# Patient Record
Sex: FEMALE | URBAN - METROPOLITAN AREA
[De-identification: names, ages, dates, MRNs, and addresses within clinical notes are randomized per-mention and may not be internally consistent; named-entity substitution may affect disease eponyms.]

---

## 2017-02-20 LAB — MAMMOGRAM: NORMAL

## 2017-06-22 ENCOUNTER — TELEPHONE (OUTPATIENT)
Dept: RHEUMATOLOGY | Facility: CLINIC | Age: 52
End: 2017-06-22

## 2017-06-22 NOTE — LETTER
July 7, 2017    Danika Light  [address] [facility] [address]      Dear Danika,     You are receiving this letter because you were referred to our clinic by Cielo Barber for systemic lupus erythematosus. We have tried to contact you to set up an appointment with one of our Rheumatologists but were unsuccessful in our attempts. This letter is to inform you that should you decide that you would like to make an appointment in our clinic, please call [phone], Option 2, then Option 3, then ask for Rheumatology staff to set up an appointment. Please understand that we will only keep your records until 8/7/17. If you contact us to make an appointment after 8/7/17, you will need to request those records again.    Thank you,     Adult Rheumatology Staff  [facility]  [phone]

## 2017-06-26 NOTE — TELEPHONE ENCOUNTER
Writer left message with patient following up to plans discussed on 6/22/17, to complete intake at a later date. Writer will attempt to contact patient at a later time and date.   Romina Hayden LPN

## 2017-07-07 NOTE — TELEPHONE ENCOUNTER
Writer has made x 3 attempts to contact patient to complete the lupus intake form and has been unsuccessful. Writer will close encounter and send letter to home address on file.   Romina Hayden LPN

## 2017-10-06 NOTE — TELEPHONE ENCOUNTER
Pt called back, saying she needs to set up appt. Can be reached at [phone]. Sent to ZACH Vanegas.

## 2017-10-16 ENCOUNTER — DOCUMENTATION ONLY (OUTPATIENT)
Dept: RHEUMATOLOGY | Facility: CLINIC | Age: 52
End: 2017-10-16

## 2017-10-16 NOTE — PROGRESS NOTES
Lupus Patient Intake Form    Referring provider/clinic: Dr. [facility] in [place], [place]     Primary privider/clinic:      Rheumatologist in [place] pain management and needs to establish care to manage condition     Have you been in the hospital because of Lupus? No If yes, where and when?     Has your doctor ever told you that you have SLE (systemic lupus erythematosus)? Yes . If yes, when were you diagnosed? 02/2017.  What symptoms did you have? Fatigue, muscle and joint pain, rash , hair loss and a low grade fever. What are your flare symptoms? Fever, fatigue, joint and muscle pain and intermittent rash. Poor immune response.     Who diagnosed you with lupus?    [facility] [place]     Have you ever been told that you have fibromyalgia? No If yes, when were you diagnosed? . What symptoms did you have? .    The following set of questions can be answered yes, no or I don't know. Do you currently have or have you had in the past any of the following symptoms?      Butterfly rash on the face Yes     Sun sensativity (e.g. easy burning, feeling sick in the sun?) Yes     Scarring rash Yes w/blisters     Mouth or nose sores Yes     Joint pain or swelling Yes      Fluid in your lungs or around your heart (serositis) No    Blood disorder (e.g. anemia, low blood count) No     History of kidney problems No If yes, what problems did you have?     Neurologic disorder (e.g. Seizures, stroke) No    Lupus blood tests showing that you have lupus Yes     Immune system disorder other than lupus No If yes, what immune disorder did you have?     High blood pressure Yes     Diabetes No    High cholesterol No    History of heart problems Yes  If yes, what heart problems do you have? Heart murmur dx: age 17    Any other disease that I haven't mentioned? none    Hair loss Yes     Raynaud's (hands sensitive to cold) Yes     Skin rashes Yes     Dry eyes Yes      Use of artificial  tears for dry eyes Yes     Dry mouth Yes      History of blood clots No    History of miscarriages Yes  If yes, at what week in pregnancy? 8 weeks     Chronic cough, shortness of breath or chest pain with breathing SOB and chest pain with breathing    Depression or anxiety Yes     Thinking or memory problems Yes     Have you been on any of these medications:      Prednisone No    Methotrexate No    Imuran (Azathioprine) No     Cellcept (mycophenolate) No    Hydroxychloroquine No    Cytoxan (Cyclophosphamide) No    Do you currently smoke or have you ever smoked in the past? yes If yes, how many years/packs per week? 32 years and 5 packs/week hen did you quit? 2017-5 months     How many alcoholic beverages do you drink per week? Occasionally, once per month     Do you use any stimulant drugs or cold medicine? No     Is there a family history or diagnosis of an autoimmune disease? No If yes, please list:     Are there any other symptoms or concerns that I haven't mentioned that you would like to discuss with the doctor? no    Would you like us to contact you about taking part in furture research studies? Yes     Romina Hayden LPN    10/16/2017  3:27 pm

## 2017-12-29 NOTE — PROGRESS NOTES
Called patient to follow up on release of information form. Spoke with patient she said that she returned a form back in October. Patient understood that we have not received the forms and requested we sent her new forms to fill out. Release of information forms have been placed in the mail per patient's request.    Laura Burns CMA  December 29, 2017

## 2018-01-23 NOTE — PROGRESS NOTES
Called patient's Home number on file [phone] (home) and spoke with patient and offered an appointment on 2/26/2018 with Dr. Root. Patient accepted the date and time and was instructed to arrive 15 minutes prior to appointment and asked to bring a current medication list. Patient verbalized understanding.    Records have been received and placed in the black box in the clinic.      Message has been sent to Clinic Coordinator for assistance with scheduling.  Nola Hamlin CMA  1/23/2018 10:23 AM

## 2018-02-26 ENCOUNTER — OFFICE VISIT (OUTPATIENT)
Dept: RHEUMATOLOGY | Facility: CLINIC | Age: 53
End: 2018-02-26
Attending: INTERNAL MEDICINE
Payer: COMMERCIAL

## 2018-02-26 VITALS
HEIGHT: 65 IN | DIASTOLIC BLOOD PRESSURE: 79 MMHG | HEART RATE: 65 BPM | TEMPERATURE: 98.2 F | SYSTOLIC BLOOD PRESSURE: 138 MMHG | WEIGHT: 150.4 LBS | BODY MASS INDEX: 25.06 KG/M2

## 2018-02-26 DIAGNOSIS — M35.00 SICCA, UNSPECIFIED TYPE (H): ICD-10-CM

## 2018-02-26 DIAGNOSIS — M79.7 FIBROMYALGIA: ICD-10-CM

## 2018-02-26 DIAGNOSIS — M32.9 SYSTEMIC LUPUS ERYTHEMATOSUS, UNSPECIFIED SLE TYPE, UNSPECIFIED ORGAN INVOLVEMENT STATUS (H): ICD-10-CM

## 2018-02-26 DIAGNOSIS — R14.3 FLATULENCE, ERUCTATION, AND GAS PAIN: ICD-10-CM

## 2018-02-26 DIAGNOSIS — M35.01 SJOGREN'S SYNDROME WITH KERATOCONJUNCTIVITIS SICCA (H): ICD-10-CM

## 2018-02-26 DIAGNOSIS — M35.00 SICCA, UNSPECIFIED TYPE (H): Primary | ICD-10-CM

## 2018-02-26 DIAGNOSIS — R14.1 FLATULENCE, ERUCTATION, AND GAS PAIN: ICD-10-CM

## 2018-02-26 DIAGNOSIS — R14.2 FLATULENCE, ERUCTATION, AND GAS PAIN: ICD-10-CM

## 2018-02-26 PROBLEM — M32.0: Status: ACTIVE | Noted: 2018-02-26

## 2018-02-26 LAB
ALBUMIN SERPL-MCNC: 3.8 G/DL (ref 3.4–5)
ALBUMIN UR-MCNC: NEGATIVE MG/DL
ALP SERPL-CCNC: 128 U/L (ref 40–150)
ALT SERPL W P-5'-P-CCNC: 19 U/L (ref 0–50)
ANION GAP SERPL CALCULATED.3IONS-SCNC: 5 MMOL/L (ref 3–14)
APPEARANCE UR: CLEAR
AST SERPL W P-5'-P-CCNC: 17 U/L (ref 0–45)
BASOPHILS # BLD AUTO: 0.1 10E9/L (ref 0–0.2)
BASOPHILS NFR BLD AUTO: 1 %
BILIRUB DIRECT SERPL-MCNC: <0.1 MG/DL (ref 0–0.2)
BILIRUB SERPL-MCNC: 0.2 MG/DL (ref 0.2–1.3)
BILIRUB UR QL STRIP: NEGATIVE
BUN SERPL-MCNC: 10 MG/DL (ref 7–30)
CALCIUM SERPL-MCNC: 8.9 MG/DL (ref 8.5–10.1)
CHLORIDE SERPL-SCNC: 105 MMOL/L (ref 94–109)
CK SERPL-CCNC: 87 U/L (ref 30–225)
CO2 SERPL-SCNC: 30 MMOL/L (ref 20–32)
COLOR UR AUTO: YELLOW
CREAT SERPL-MCNC: 0.77 MG/DL (ref 0.52–1.04)
DAT POLY-SP REAG RBC QL: NORMAL
DIFFERENTIAL METHOD BLD: NORMAL
EOSINOPHIL # BLD AUTO: 0.4 10E9/L (ref 0–0.7)
EOSINOPHIL NFR BLD AUTO: 5.6 %
ERYTHROCYTE [DISTWIDTH] IN BLOOD BY AUTOMATED COUNT: 12.7 % (ref 10–15)
GFR SERPL CREATININE-BSD FRML MDRD: 78 ML/MIN/1.7M2
GLUCOSE SERPL-MCNC: 73 MG/DL (ref 70–99)
GLUCOSE UR STRIP-MCNC: NEGATIVE MG/DL
HBV CORE AB SERPL QL IA: NONREACTIVE
HBV SURFACE AG SERPL QL IA: NONREACTIVE
HCT VFR BLD AUTO: 41.7 % (ref 35–47)
HCV AB SERPL QL IA: NONREACTIVE
HGB BLD-MCNC: 13.5 G/DL (ref 11.7–15.7)
HGB UR QL STRIP: NEGATIVE
IMM GRANULOCYTES # BLD: 0 10E9/L (ref 0–0.4)
IMM GRANULOCYTES NFR BLD: 0.2 %
KETONES UR STRIP-MCNC: NEGATIVE MG/DL
LEUKOCYTE ESTERASE UR QL STRIP: NEGATIVE
LYMPHOCYTES # BLD AUTO: 2.2 10E9/L (ref 0.8–5.3)
LYMPHOCYTES NFR BLD AUTO: 35.4 %
MCH RBC QN AUTO: 31.4 PG (ref 26.5–33)
MCHC RBC AUTO-ENTMCNC: 32.4 G/DL (ref 31.5–36.5)
MCV RBC AUTO: 97 FL (ref 78–100)
MONOCYTES # BLD AUTO: 0.7 10E9/L (ref 0–1.3)
MONOCYTES NFR BLD AUTO: 11.6 %
MUCOUS THREADS #/AREA URNS LPF: PRESENT /LPF
NEUTROPHILS # BLD AUTO: 2.9 10E9/L (ref 1.6–8.3)
NEUTROPHILS NFR BLD AUTO: 46.2 %
NITRATE UR QL: NEGATIVE
NRBC # BLD AUTO: 0 10*3/UL
NRBC BLD AUTO-RTO: 0 /100
PH UR STRIP: 7 PH (ref 5–7)
PLATELET # BLD AUTO: 162 10E9/L (ref 150–450)
POTASSIUM SERPL-SCNC: 3.8 MMOL/L (ref 3.4–5.3)
PROT SERPL-MCNC: 7.8 G/DL (ref 6.8–8.8)
RBC # BLD AUTO: 4.3 10E12/L (ref 3.8–5.2)
RBC #/AREA URNS AUTO: <1 /HPF (ref 0–2)
SODIUM SERPL-SCNC: 140 MMOL/L (ref 133–144)
SOURCE: ABNORMAL
SP GR UR STRIP: 1.01 (ref 1–1.03)
SQUAMOUS #/AREA URNS AUTO: <1 /HPF (ref 0–1)
TSH SERPL DL<=0.005 MIU/L-ACNC: 1.03 MU/L (ref 0.4–4)
UROBILINOGEN UR STRIP-MCNC: 0 MG/DL (ref 0–2)
WBC # BLD AUTO: 6.3 10E9/L (ref 4–11)
WBC #/AREA URNS AUTO: <1 /HPF (ref 0–2)

## 2018-02-26 PROCEDURE — 00000401 ZZHCL STATISTIC THROMBIN TIME NC: Performed by: INTERNAL MEDICINE

## 2018-02-26 PROCEDURE — 86880 COOMBS TEST DIRECT: CPT | Performed by: INTERNAL MEDICINE

## 2018-02-26 PROCEDURE — 82784 ASSAY IGA/IGD/IGG/IGM EACH: CPT | Performed by: INTERNAL MEDICINE

## 2018-02-26 PROCEDURE — 80048 BASIC METABOLIC PNL TOTAL CA: CPT | Performed by: INTERNAL MEDICINE

## 2018-02-26 PROCEDURE — 86255 FLUORESCENT ANTIBODY SCREEN: CPT | Performed by: INTERNAL MEDICINE

## 2018-02-26 PROCEDURE — 84443 ASSAY THYROID STIM HORMONE: CPT | Performed by: INTERNAL MEDICINE

## 2018-02-26 PROCEDURE — 86147 CARDIOLIPIN ANTIBODY EA IG: CPT | Performed by: INTERNAL MEDICINE

## 2018-02-26 PROCEDURE — 86146 BETA-2 GLYCOPROTEIN ANTIBODY: CPT | Performed by: INTERNAL MEDICINE

## 2018-02-26 PROCEDURE — 86235 NUCLEAR ANTIGEN ANTIBODY: CPT | Performed by: INTERNAL MEDICINE

## 2018-02-26 PROCEDURE — 86160 COMPLEMENT ANTIGEN: CPT | Performed by: INTERNAL MEDICINE

## 2018-02-26 PROCEDURE — 00000167 ZZHCL STATISTIC INR NC: Performed by: INTERNAL MEDICINE

## 2018-02-26 PROCEDURE — 85613 RUSSELL VIPER VENOM DILUTED: CPT | Performed by: INTERNAL MEDICINE

## 2018-02-26 PROCEDURE — 86704 HEP B CORE ANTIBODY TOTAL: CPT | Performed by: INTERNAL MEDICINE

## 2018-02-26 PROCEDURE — 85025 COMPLETE CBC W/AUTO DIFF WBC: CPT | Performed by: INTERNAL MEDICINE

## 2018-02-26 PROCEDURE — 86225 DNA ANTIBODY NATIVE: CPT | Performed by: INTERNAL MEDICINE

## 2018-02-26 PROCEDURE — 85730 THROMBOPLASTIN TIME PARTIAL: CPT | Performed by: INTERNAL MEDICINE

## 2018-02-26 PROCEDURE — 82550 ASSAY OF CK (CPK): CPT | Performed by: INTERNAL MEDICINE

## 2018-02-26 PROCEDURE — 83516 IMMUNOASSAY NONANTIBODY: CPT | Performed by: INTERNAL MEDICINE

## 2018-02-26 PROCEDURE — 81001 URINALYSIS AUTO W/SCOPE: CPT | Performed by: INTERNAL MEDICINE

## 2018-02-26 PROCEDURE — 80076 HEPATIC FUNCTION PANEL: CPT | Performed by: INTERNAL MEDICINE

## 2018-02-26 PROCEDURE — 86803 HEPATITIS C AB TEST: CPT | Performed by: INTERNAL MEDICINE

## 2018-02-26 PROCEDURE — G0463 HOSPITAL OUTPT CLINIC VISIT: HCPCS | Mod: ZF

## 2018-02-26 PROCEDURE — 86431 RHEUMATOID FACTOR QUANT: CPT | Performed by: INTERNAL MEDICINE

## 2018-02-26 PROCEDURE — 82787 IGG 1 2 3 OR 4 EACH: CPT | Performed by: INTERNAL MEDICINE

## 2018-02-26 PROCEDURE — 82306 VITAMIN D 25 HYDROXY: CPT | Performed by: INTERNAL MEDICINE

## 2018-02-26 PROCEDURE — 87389 HIV-1 AG W/HIV-1&-2 AB AG IA: CPT | Performed by: INTERNAL MEDICINE

## 2018-02-26 PROCEDURE — 87340 HEPATITIS B SURFACE AG IA: CPT | Performed by: INTERNAL MEDICINE

## 2018-02-26 PROCEDURE — 82595 ASSAY OF CRYOGLOBULIN: CPT | Performed by: INTERNAL MEDICINE

## 2018-02-26 PROCEDURE — 86256 FLUORESCENT ANTIBODY TITER: CPT | Performed by: INTERNAL MEDICINE

## 2018-02-26 PROCEDURE — 36415 COLL VENOUS BLD VENIPUNCTURE: CPT | Performed by: INTERNAL MEDICINE

## 2018-02-26 PROCEDURE — 85597 PHOSPHOLIPID PLTLT NEUTRALIZ: CPT | Performed by: INTERNAL MEDICINE

## 2018-02-26 RX ORDER — LANOLIN ALCOHOL/MO/W.PET/CERES
50 CREAM (GRAM) TOPICAL
COMMUNITY
Start: 2017-03-27

## 2018-02-26 RX ORDER — PILOCARPINE HYDROCHLORIDE 5 MG/1
TABLET, FILM COATED ORAL
Qty: 30 TABLET | Refills: 0 | Status: SHIPPED | OUTPATIENT
Start: 2018-02-26 | End: 2018-04-09

## 2018-02-26 RX ORDER — DOXEPIN HYDROCHLORIDE 10 MG/ML
10 SOLUTION ORAL
COMMUNITY
Start: 2017-02-13

## 2018-02-26 ASSESSMENT — PAIN SCALES - GENERAL: PAINLEVEL: MODERATE PAIN (4)

## 2018-02-26 NOTE — NURSING NOTE
"Chief Complaint   Patient presents with     Consult     establish care with lupus       Initial /79  Pulse 65  Temp 98.2  F (36.8  C) (Oral)  Ht 1.638 m (5' 4.5\")  Wt 68.2 kg (150 lb 6.4 oz)  BMI 25.42 kg/m2 Estimated body mass index is 25.42 kg/(m^2) as calculated from the following:    Height as of this encounter: 1.638 m (5' 4.5\").    Weight as of this encounter: 68.2 kg (150 lb 6.4 oz).  Medication Reconciliation: complete   Savanah Soto      "

## 2018-02-26 NOTE — PATIENT INSTRUCTIONS
Blood work today   Urine test   Start evoxac as needed for dry mouth   Start routine exercise (aerobic) and stretching - yves chi and or yoga   Based on blood work, can consider pursuing lip biopsy   Will consider starting plaquenil 200 mg twice a day - will talk based on results from today   Return in 4 months with me

## 2018-02-26 NOTE — MR AVS SNAPSHOT
After Visit Summary   2/26/2018    Danika Light    MRN: [id number]           Patient Information     Date Of Birth          1965        Visit Information        Provider Department      2/26/2018 9:30 AM Bailey Root MD [facility]        Today's Diagnoses     Sicca, unspecified type (H)    -  1    Systemic lupus erythematosus, unspecified SLE type, unspecified organ involvement status (H)        Fibromyalgia          Care Instructions    Blood work today   Urine test   Start evoxac as needed for dry mouth   Start routine exercise (aerobic) and stretching - yves chi and or yoga   Based on blood work, can consider pursuing lip biopsy   Will consider starting plaquenil 200 mg twice a day - will talk based on results from today   Return in 4 months with me                  Follow-ups after your visit        Follow-up notes from your care team     Return in about 4 months (around 6/26/2018).      Your next 10 appointments already scheduled     Feb 26, 2018 12:15 PM CST   Lab with  LAB   [facility] ([facility])    [address]  [address]  [place] [id number] [phone]            Jun 25, 2018  9:30 AM CDT   (Arrive by 9:15 AM)   Return Visit with Bailey Root MD   [facility] ([facility])    [address]  [address]  [place] [id number] [phone]              Future tests that were ordered for you today     Open Future Orders        Priority Expected Expires Ordered    Direct antiglobulin test (DATG) Routine  2/26/2019 2/26/2018    Lupus Anticoagulant Panel Routine  2/26/2019 2/26/2018    Beta 2 Glycoprotein 1 Antibody IgG Routine  2/26/2019 2/26/2018    Beta 2 Glycoprotein 1 Antibody IgM Routine  2/26/2019 2/26/2018    Cardiolipin Paige IgG and IgM Routine  2/26/2019 2/26/2018    TSH with free T4 reflex Routine  2/26/2019 2/26/2018    Immunoglobulin G subclasses Routine   2/26/2019 2/26/2018    IgG Routine  2/26/2019 2/26/2018    Hepatitis B core antibody Routine  2/26/2019 2/26/2018    Hepatitis B surface antigen Routine  2/26/2019 2/26/2018    Hepatitis C antibody Routine  2/26/2019 2/26/2018    CK total Routine  2/26/2019 2/26/2018    IgG Routine  2/26/2019 2/26/2018    IgM Routine  2/26/2019 2/26/2018    IgA Routine  2/26/2019 2/26/2018    Cryoglobulin quantitative Routine  2/26/2019 2/26/2018    Centromere Antibody IgG Routine  2/26/2019 2/26/2018    Routine UA with microscopic Routine  2/26/2019 2/26/2018    Basic metabolic panel Routine  2/26/2019 2/26/2018    CBC with platelets differential Routine  2/26/2019 2/26/2018    Hepatic panel Routine  2/26/2019 2/26/2018    Complement C3 Routine  2/26/2019 2/26/2018    Complement C4 Routine  2/26/2019 2/26/2018    DNA double stranded antibodies Routine  2/26/2019 2/26/2018    ALBINO Panel (RNP, SMITH, Scleroderma, SSAB, SSBB); ALBINO, Antibodies, Panel Routine  2/26/2019 2/26/2018    Rheumatoid factor Routine  2/26/2019 2/26/2018            Who to contact     If you have questions or need follow up information about today's clinic visit or your schedule please contact [facility] directly at [phone].  Normal or non-critical lab and imaging results will be communicated to you by memory lane syndicationshart, letter or phone within 4 business days after the clinic has received the results. If you do not hear from us within 7 days, please contact the clinic through [employer] or phone. If you have a critical or abnormal lab result, we will notify you by phone as soon as possible.  Submit refill requests through [employer] or call your pharmacy and they will forward the refill request to us. Please allow 3 business days for your refill to be completed.          Additional Information About Your Visit        memory lane syndicationshart Information     [employer] lets you send messages to your doctor, view your test results, renew your prescriptions, schedule appointments and  "more. To sign up, go to [email or web address] . Click on \"Log in\" on the left side of the screen, which will take you to the Welcome page. Then click on \"Sign up Now\" on the right side of the page.     You will be asked to enter the access code listed below, as well as some personal information. Please follow the directions to create your username and password.     Your access code is: [email or web address]  Expires: 2018  6:30 AM     Your access code will  in 90 days. If you need help or a new code, please call your [place] clinic or [phone].        Care EveryWhere ID     This is your Care EveryWhere ID. This could be used by other organizations to access your [facility] medical records  [id number]        Your Vitals Were     Pulse Temperature Height BMI (Body Mass Index)          65 98.2  F (36.8  C) (Oral) 1.638 m (5' 4.5\") 25.42 kg/m2         Blood Pressure from Last 3 Encounters:   18 138/79    Weight from Last 3 Encounters:   18 68.2 kg (150 lb 6.4 oz)                 Today's Medication Changes          These changes are accurate as of 18 11:55 AM.  If you have any questions, ask your nurse or doctor.               Start taking these medicines.        Dose/Directions    pilocarpine 5 MG tablet   Commonly known as:  SALAGEN   Used for:  Sicca, unspecified type (H)   Started by:  Bailey Root MD        Take 1 tablet up to 3x daily for dry mouth   Quantity:  30 tablet   Refills:  0            Where to get your medicines      These medications were sent to [id number] - [place], [place] - [address] [place] [address]    Hours:  Test fax successful 03 kr Phone:  [phone]     pilocarpine 5 MG tablet                Primary Care Provider Fax #    Provider Not In System [phone]                Equal Access to Services     DEEDEE TANG AH: Kelsey Fong, judi jenkins, romario pires " dustinward nevaivan laIvetteaabettie ah. So [facility] [phone].    ATENCIÓN: Si remediosla rob, tiene a raymond disposición servicios gratuitos de asistencia lingüística. Darrell al [phone].    We comply with applicable federal civil rights laws and [place] laws. We do not discriminate on the basis of race, color, national origin, age, disability, sex, sexual orientation, or gender identity.            Thank you!     Thank you for choosing [facility] RHEUMAT[facility]  for your care. Our goal is always to provide you with excellent care. Hearing back from our patients is one way we can continue to improve our services. Please take a few minutes to complete the written survey that you may receive in the mail after your visit with us. Thank you!             Your Updated Medication List - Protect others around you: Learn how to safely use, store and throw away your medicines at www.disposemymeds.org.          This list is accurate as of 2/26/18 11:55 AM.  Always use your most recent med list.                   Brand Name Dispense Instructions for use Diagnosis    doxepin 10 MG/ML (HIGH CONC) solution    SINEquan     10 mg        pilocarpine 5 MG tablet    SALAGEN    30 tablet    Take 1 tablet up to 3x daily for dry mouth    Sicca, unspecified type (H)       pyridoxine 50 MG Tabs    VITAMIN B-6     50 mg        ZANTAC PO      Take by mouth 2 times daily

## 2018-02-26 NOTE — LETTER
2/26/2018       RE: Danika Light  [id number] [facility] [address]     Dear Colleague,    Thank you for referring your patient, Danika Light, to the [facility] RHEUMATOLOGY at [facility]. Please see a copy of my visit note below.    [facility] - Rheumatology Clinic Visit     Danika Light MRN# [id number]   YOB: 1965    Primary care provider: System, Provider Not In  Feb 26, 2018          Assessment and Plan:     # positive HAN, likely mild SLE vs Sjogren's vs UCTD; sicca, Raynaud's, arthralgias, myalgias, paresthesias  # fibromyalgia   # osteopenia - T score - 1.6 of L femur 1/2016  # hx of tricuspid valvular abnormality, do not see prior TTE  # hx of L shoulder adhesive capsulitis, significantly improved with PT    The patient has a collection of signs and symptoms that are concerning for a connective tissue disease such as SLE, Sjogren's syndrome, Systemic sclerosis, vs other. She does actually meet SLICC criteria for SLE with +HAN, LAC, and arthritis, oral ulcers, and alopecia, although if this is truly SLE it is quite mild with no severe internal organ involvement. I will screen for CTD with remaining ALBINO panel including SSA, SSB, RNP, Scl-70, and recheck dsDNA and complement levels. To complete workup for Sjogren's I will send RF, cryogs, immunoglobulins, centromere, and celiac panel (given GI symptoms). I will screen for chronic infections that may mimic CTD such as HCV, HBV, HIV, TB. I did discuss even if SSA/SSB return negative, she may still have seronegative Sjooren's syndrome and I discussed possible role for lip biopsy to prove diagnosis, although this wouldn't necessarily change our treatment. I did discuss role for plaquenil but we agreed to hold off on initiating until we complete the workup. I will go ahead and start her on a cholinergic medication to stimulate saliva flow glenn given her extensive dental issues likely at least in  part due to lack of saliva production. Ddx beyond the above can also include sarcoidosis, ANCA-associated vasculitis (given sinus issues, although ANCA in 1/2017 negative, CT sinuses 2015 showed minimal mucosa disease), etc. I do think there is a component of FMS as well, but I do not think this accounts for all of her symptoms. I did discuss diagnosis of FMS and treatment strategies.     Recommendations  - check ALBINO panel including dsDNA, C3/C4, quantitative immunoglobulins, IgG subclasses, RF, cryoglobulins, spep, MELODY, APS Ab's, HCV, HBV, HIV, celiac panel, CK, TSH w/ reflex FT4, Vit D, as well as CBC, BMP, UA, LFT  - tried to order evoxac - not covered by insurance. Will trial pilocarpine 5 mg up to 3x per day PRN for dry mouth. Encouraged her to try biotene products.   - consider addition of  mg BID. We agreed to complete lab workup before pursuing immunomodulatory therapy.    - likely needs updated TTE and heart monitoring (?Holter), updated hand xrays, baseline CXR, and quantiferon gold testing, will obtain at upcoming visit. Also consider formal neuropsych testing given brain fog   - encouraged Ca/vit D supplementation given osteopenia. It has been >2 years since her last DEXA, would be worthwhile to repeat. Will discuss at next visit. Need to calculate FRAX to determine if bisphosphonate warranted. In either case, should stop smoking and incorporate weight-bearing exercise.   - also discussed tx for FMS - trying to incorporate routine exercise regimen, routine stretching such as yves chi and/or yoga, improving sleep quality   - should be on PPI or H2 blocker on daily basis given GERD symptoms    Health maintenance  HBV: today  HCV: today  HIV:  today  TB: in future  Vitamin D and calcium/bone health: Vit D normal 1/2017. Recheck today  Cancer screenings (skin, breast, colon, pap): mammogram, colonoscopy UTD  Immunizations:       Influenza:       PPSV23:       Ajdtyox60:       Zostavax:      I will be  back in touch with the patient through mychart/letter when results are available.     RTC 4 m, sooner PRN    Patient was seen and discussed with Dr. Villeda.     Bailey Root MD  Rheumatology Fellow  Pager [phone]    Attending Note: I saw and evaluated the patient with Dr. Root. I agree with the assessment and plan.    Saji Villeda MD    Orders Placed This Encounter   Procedures     Complement C3     Complement C4     DNA double stranded antibodies     ALBINO Panel (RNP, SMITH, Scleroderma, SSAB, SSBB); ALBINO, Antibodies, Panel     Rheumatoid factor     Routine UA with microscopic     Basic metabolic panel     CBC with platelets differential     Hepatic panel     Hepatitis B core antibody     Hepatitis B surface antigen     Hepatitis C antibody     CK total     IgG     IgM     IgA     Cryoglobulin quantitative     Centromere Antibody IgG     Immunoglobulin G subclasses     IgG     TSH with free T4 reflex     Lupus Anticoagulant Panel     Beta 2 Glycoprotein 1 Antibody IgG     Beta 2 Glycoprotein 1 Antibody IgM     Cardiolipin Paige IgG and IgM     HIV Antigen Antibody Combo     Tissue transglutaminase antibody IgA     Deamidated Giladin Peptide Paige IgA IgG     Endomysial Antibody IgA by IFA     Vitamin D Deficiency     Direct antiglobulin test (DATG)               Active Problem List:     Patient Active Problem List    Diagnosis Date Noted     Drug-induced systemic lupus erythematosus, unspecified organ involvement status (H) 02/26/2018     Priority: Medium            History of Present Illness:     Chief Complaint   Patient presents with     Consult     establish care with lupus       The patient is a 52 year old female who presents today as a self-referral for a history of SLE. She reports a long-standing history of fatigue, myalgias, arthralgias, weakness, paresthesias, sicca symptoms, Raynaud's (since her 30's), alopecia, and various rashes. She was found to have a positive HAN 1:160 (homogeneous  "pattern) and MPO (but negative ANCA) and was referred to rheumatology in 2/2017. She was initially evaluated by Dr. Hartman of [facility] and diagnosed with fibromyalgia, chronic pain syndrome, and osteoarthritis. Additional lab testing revealed positive lupus anticoagulant and low titer + aCL IgM, with negative dsDNA, Feliz, RF/CCP, ANCA, and normal C3/C4. She was treated with zofran as needed for nausea, doxepin at night for sleep, and celexa and tramadol for FMS symptoms.     The patient reports for many years she has had various symptoms that have largely  Been unexplained. She reports developing Raynaud's of her hands/feet in her 30's, describing color change to white then red with cold exposure, but also notes atypical symptoms of this happening in the summer as well. She denies having ulcers of the tips of fingers, but does think her fingers/toes have cracking skin. She also notes a long history of joint pain including most joints involving the feet, knees, hips (points anterior groin), hands, wrists, and shoulders. Symptoms seem to be more severe on L side. She denies swelling of the joints, but does note her hands will feel puffy and it is hard to get her rings on/off. She does have some morning stiffness lasting about an hour. She also notes both muscle pain and weakness \"all over,\" but specifically points out decreased  strength. She has noticed she's dropping objects. She does recall having EMG of her arms but thinks this was normal. She has chronic back pain and sciatica of her left leg.     She also notes various rashes that are heat sensitive (not actually photosensitive or just in areas of sun exposure), with a blistering rash on her chest, back, arms, and shoulders. She has never seen dermatology for this and not had this biopsied. She notes numbness/tingling \"all over\" that is lightning like, lasting only seconds and occurs intermittently without obvious trigger. She has ongoing " "sicca symptoms for many years. Follows with ophthalmology and uses AT as needed. Has never been on restasis or xiidra. She does think she had Schirmer's testing done at some point, but she is unclear. She does not wear contacts. Regarding her dry mouth, she drinks water frequently throughout the day. She does not use biotene products and has never been on evoxac or salagen. She did have to have many teeth pulled in the last 10 years due to poor dentition. She does have GERD symptoms mostly at night. Does describe oropharyngeal dysphagia. Uses ranitidine as needed which does help. Describes brain fog symptoms.     Has chronic abdominal bloating and constipation. Some diarrhea. Eats one meal per day but gaining weight. Has had a colonoscopy several years ago that she reports was normal. Some chronic sinus problems, frequent chest colds, epistaxis. Does report oral ulcers, tiny bumps along the upper front gums that she was told may have been thrush (but didn't improve with nystatin).     Describes intermittent \"dizzy\" episodes which she describes more as lightheadedness, SOB, and palpitations. These can occur at rest, not just with activity. She has not had this worked up by cardiology or her primary. Is in between primary care providers currently as recent one retired. Reports hx of tricuspid valvular disease, although she can't recall when her last TTE was.     Describes \"flares\" of many of the above symptoms, primarily arthralgias, myalgias, weakness that will last for several weeks then resolve on own (has never been on prednisone), occurring about 5x per year. She has never been on plaquenil. Has tried tylenol and ibuprofen with some help for muscle/joint pain. Naproxen didn't help.     Doesn't sleep well at night. Will wake up and not be able to fall back asleep due to pain. Doxepin does help her sleep some. Does not have routine exercise regimen.     Hx of 1 late 1st trimester/early 2nd trimester miscarriage " "that required a D&C. No hx of blood clots. No fam hx of recurrent miscarriage.            Review of Systems:   Complete ROS negative except for symptoms mentioned in the HPI            Past Medical History:   L shoulder adhesive capsulitis  Fibromyalgia  Chronic pain syndrome  Raynaud's phenomenon since age 30's          Social History:   Has 4 children - 2 younger that live in house and 2 out of house. Daughter at [employer] today   -  at [employer] today  Hasn't worked in 8 years due to her symptoms   Occasional EtOH use  Current smoker - 2 packs/week         Family History:     GF - CVA, recurrent thrombosis  MGM - t1DM (diagnosed in 30's)  PGM - RA         Allergies:     Allergies   Allergen Reactions     Cefaclor Hives            Medications:     Current Outpatient Prescriptions   Medication Sig Dispense Refill     doxepin (SINEQUAN) 10 MG/ML (HIGH CONC) solution 10 mg       pyridoxine (VITAMIN B-6) 50 MG TABS 50 mg       RaNITidine HCl (ZANTAC PO) Take by mouth 2 times daily       pilocarpine (SALAGEN) 5 MG tablet Take 1 tablet up to 3x daily for dry mouth 30 tablet 0            Physical Exam:   Blood pressure 138/79, pulse 65, temperature 98.2  F (36.8  C), temperature source Oral, height 1.638 m (5' 4.5\"), weight 68.2 kg (150 lb 6.4 oz).  Wt Readings from Last 4 Encounters:   02/26/18 68.2 kg (150 lb 6.4 oz)     BP Readings from Last 3 Encounters:   02/26/18 138/79       Constitutional: well-developed, appearing stated age  Eyes: PERRLA, normal conjunctiva, sclera  ENT: nl external ears, nose, lips. No mucous membrane lesions, slightly decreased salivary pool. Wearing partials on upper teeth.   Neck: no cervical or supraclavicular lymphadenopathy, no parotid swelling, normal palpable thyroid  Resp: CTAB, no wheezing, breathing comfortably on room air  CV: RRR, no m/r/g, no LE edema  GI: soft, NT/ND, +BS, no HSM  : not tested  Lymph: no cervical, supraclavicular or epitrochlear nodes  MS: All joints " including shoulder, elbow, wrist, MCP/PIP/DIP, hip, knee, ankle, and foot MTP/PIP/DIP joints examined and found normal with full ROM and w/o synovitis or deformity other than that listed below:   Neck - no tenderness, FROM  Shoulder - L shoulder mild pain anterior/posterior palpation and pain with ROM, but able to do. FROM in full abduction/adduction and internal/external rotation. No GH effusion/warmth. AC and SC joints without effusion or tenderness   Elbow - FROM and no joint effusion; nontender BL   Wrist - FROM and no joint effusion. Mild tenderness to wrists without synovitis  Hand - mild tenderness to MCP and PIP joints without synovitis. Some tenderness inter-articular between joints along phalanges without swelling. Able to make full fist and normal  strength  Back - no tenderness along spine  Pelvic - no tenderness along BL SI joints. HILLARY negative   Hip - FROM; nontender BL  Knee - FROM, no joint effusion or joint line tenderness  Ankle - FROM and no joint effusion, nontender   Foot - no focal pain or synovitis on palpation of the MTPs bilaterally, negative MTP squeeze. Does have well-healing scar along left lateral foot from recent surgery.   Spine and gait normal.   No active synovitis. Did have multiple tender points on exam - 16/18 FMS points.   No dactylitis,  tenosynovitis, enthesopathy.  Skin: no nail pitting; no nodules, no rash in exposed areas  Psych: nl judgement, orientation, memory, affect.  Neuro: CN II-XII grossly intact, moving all extremities equally, normal gait. Strength BL shoulder abduction 5/5, BL hip flexors 5/5         Data:     No results found for this or any previous visit.    Bailey Root MD    Lab Results   Component Value Date    WBC 6.3 02/26/2018     Lab Results   Component Value Date    RBC 4.30 02/26/2018     Lab Results   Component Value Date    HGB 13.5 02/26/2018     Lab Results   Component Value Date    HCT 41.7 02/26/2018     No components found for:  "MCT  Lab Results   Component Value Date    MCV 97 02/26/2018     Lab Results   Component Value Date    MCH 31.4 02/26/2018     Lab Results   Component Value Date    MCHC 32.4 02/26/2018     Lab Results   Component Value Date    RDW 12.7 02/26/2018     Lab Results   Component Value Date     02/26/2018       Lab Results   Component Value Date    AST 17 02/26/2018     Lab Results   Component Value Date    ALT 19 02/26/2018     No results found for: BILICONJ   Lab Results   Component Value Date    BILITOTAL 0.2 02/26/2018     Lab Results   Component Value Date    ALBUMIN 3.8 02/26/2018     Lab Results   Component Value Date    PROTTOTAL 7.8 02/26/2018      Lab Results   Component Value Date    ALKPHOS 128 02/26/2018       Last Basic Metabolic Panel:  Lab Results   Component Value Date     02/26/2018      Lab Results   Component Value Date    POTASSIUM 3.8 02/26/2018     Lab Results   Component Value Date    CHLORIDE 105 02/26/2018     Lab Results   Component Value Date    DAVY 8.9 02/26/2018     Lab Results   Component Value Date    CO2 30 02/26/2018     Lab Results   Component Value Date    BUN 10 02/26/2018     Lab Results   Component Value Date    CR 0.77 02/26/2018     Lab Results   Component Value Date    GLC 73 02/26/2018     TTE reportedly last in 2008, there was partial report pasted in prior rheumatology note:  \"Echo in 2008, obtained for history of tricuspid valve disease, showed an EF of 88%, normal LV, normal aorta, mild-to-moderate leaflet thickening of the mitral valve with trivial regurgitation, and small pericardial effusion, circumferential to the heart. Repeat evaluation was recommended in 3 months, and this was not done.\"    Prior workup:  Positive HAN x2, 1:80 and 1:160 (homogeneous)  Low titer +aCL IgM 38  Positive LAC  Negative Sm, dsDNA, aCL IgG, B2GP IgG/IgM  Normal CK, TSH, vitamin D   Negative ANCA  Normal C3, C4  OK Cr, CBC    MRI L shoulder: findings concerning for possible " adhesive capsulitis     Reviewed all relevant data including labs and imaging.     Again, thank you for allowing me to participate in the care of your patient.      Sincerely,    Bailey Root MD

## 2018-02-27 LAB
C3 SERPL-MCNC: 111 MG/DL (ref 76–169)
C4 SERPL-MCNC: 20 MG/DL (ref 15–50)
CARDIOLIPIN ANTIBODY IGG: <1.6 GPL-U/ML (ref 0–19.9)
CARDIOLIPIN ANTIBODY IGM: 8.5 MPL-U/ML (ref 0–19.9)
CENTROMERE IGG SER-ACNC: <0.2 AI (ref 0–0.9)
ENA RNP IGG SER IA-ACNC: <0.2 AI (ref 0–0.9)
ENA SCL70 IGG SER IA-ACNC: 0.2 AI (ref 0–0.9)
ENA SM IGG SER-ACNC: <0.2 AI (ref 0–0.9)
ENA SS-A IGG SER IA-ACNC: 1.8 AI (ref 0–0.9)
ENA SS-B IGG SER IA-ACNC: 0.4 AI (ref 0–0.9)
IGA SERPL-MCNC: 185 MG/DL (ref 70–380)
IGG SERPL-MCNC: 1090 MG/DL (ref 695–1620)
IGG1 SER-MCNC: 454 MG/DL (ref 300–856)
IGG2 SER-MCNC: 393 MG/DL (ref 158–761)
IGG3 SER-MCNC: 107 MG/DL (ref 24–192)
IGG4 SER-MCNC: 57 MG/DL (ref 11–86)
IGM SERPL-MCNC: 92 MG/DL (ref 60–265)
RHEUMATOID FACT SER NEPH-ACNC: <20 IU/ML (ref 0–20)

## 2018-02-28 PROBLEM — M35.00 SJOGREN'S SYNDROME (H): Status: ACTIVE | Noted: 2018-02-28

## 2018-02-28 PROBLEM — M32.0: Status: RESOLVED | Noted: 2018-02-26 | Resolved: 2018-02-28

## 2018-02-28 LAB
B2 GLYCOPROT1 IGG SERPL IA-ACNC: <0.6 U/ML
B2 GLYCOPROT1 IGM SERPL IA-ACNC: 8.2 U/ML
DEPRECATED CALCIDIOL+CALCIFEROL SERPL-MC: 32 UG/L (ref 20–75)
DSDNA AB SER-ACNC: 1 IU/ML
HIV 1+2 AB+HIV1 P24 AG SERPL QL IA: NONREACTIVE
LA PPP-IMP: POSITIVE

## 2018-02-28 NOTE — PROGRESS NOTES
[facility] - Rheumatology Clinic Visit     Danika Light MRN# [id number]   YOB: 1965    Primary care provider: System, Provider Not In  Feb 26, 2018          Assessment and Plan:     # positive HAN, likely mild SLE vs Sjogren's vs UCTD; sicca, Raynaud's, arthralgias, myalgias, paresthesias  # fibromyalgia   # osteopenia - T score - 1.6 of L femur 1/2016  # hx of tricuspid valvular abnormality, do not see prior TTE  # hx of L shoulder adhesive capsulitis, significantly improved with PT    The patient has a collection of signs and symptoms that are concerning for a connective tissue disease such as SLE, Sjogren's syndrome, Systemic sclerosis, vs other. She does actually meet SLICC criteria for SLE with +HAN, LAC, and arthritis, oral ulcers, and alopecia, although if this is truly SLE it is quite mild with no severe internal organ involvement. I will screen for CTD with remaining ALBINO panel including SSA, SSB, RNP, Scl-70, and recheck dsDNA and complement levels. To complete workup for Sjogren's I will send RF, cryogs, immunoglobulins, centromere, and celiac panel (given GI symptoms). I will screen for chronic infections that may mimic CTD such as HCV, HBV, HIV, TB. I did discuss even if SSA/SSB return negative, she may still have seronegative Sjooren's syndrome and I discussed possible role for lip biopsy to prove diagnosis, although this wouldn't necessarily change our treatment. I did discuss role for plaquenil but we agreed to hold off on initiating until we complete the workup. I will go ahead and start her on a cholinergic medication to stimulate saliva flow glenn given her extensive dental issues likely at least in part due to lack of saliva production. Ddx beyond the above can also include sarcoidosis, ANCA-associated vasculitis (given sinus issues, although ANCA in 1/2017 negative, CT sinuses 2015 showed minimal mucosa disease), etc. I do think there is a component of FMS as well,  but I do not think this accounts for all of her symptoms. I did discuss diagnosis of FMS and treatment strategies.     Recommendations  - check ALBINO panel including dsDNA, C3/C4, quantitative immunoglobulins, IgG subclasses, RF, cryoglobulins, spep, MELODY, APS Ab's, HCV, HBV, HIV, celiac panel, CK, TSH w/ reflex FT4, Vit D, as well as CBC, BMP, UA, LFT  - tried to order evoxac - not covered by insurance. Will trial pilocarpine 5 mg up to 3x per day PRN for dry mouth. Encouraged her to try biotene products.   - consider addition of  mg BID. We agreed to complete lab workup before pursuing immunomodulatory therapy.    - likely needs updated TTE and heart monitoring (?Holter), updated hand xrays, baseline CXR, and quantiferon gold testing, will obtain at upcoming visit. Also consider formal neuropsych testing given brain fog   - encouraged Ca/vit D supplementation given osteopenia. It has been >2 years since her last DEXA, would be worthwhile to repeat. Will discuss at next visit. Need to calculate FRAX to determine if bisphosphonate warranted. In either case, should stop smoking and incorporate weight-bearing exercise.   - also discussed tx for FMS - trying to incorporate routine exercise regimen, routine stretching such as yves chi and/or yoga, improving sleep quality   - should be on PPI or H2 blocker on daily basis given GERD symptoms    Health maintenance  HBV: today  HCV: today  HIV:  today  TB: in future  Vitamin D and calcium/bone health: Vit D normal 1/2017. Recheck today  Cancer screenings (skin, breast, colon, pap): mammogram, colonoscopy UTD  Immunizations:       Influenza:       PPSV23:       Rkxsqye20:       Zostavax:      I will be back in touch with the patient through mychart/letter when results are available.     RTC 4 m, sooner PRN    Patient was seen and discussed with Dr. Villeda.     Bailey Root MD  Rheumatology Fellow  Pager [phone]    Attending Note: I saw and evaluated the patient  with Dr. Root. I agree with the assessment and plan.    Saji Villeda MD    Orders Placed This Encounter   Procedures     Complement C3     Complement C4     DNA double stranded antibodies     ALBINO Panel (RNP, SMITH, Scleroderma, SSAB, SSBB); ALBINO, Antibodies, Panel     Rheumatoid factor     Routine UA with microscopic     Basic metabolic panel     CBC with platelets differential     Hepatic panel     Hepatitis B core antibody     Hepatitis B surface antigen     Hepatitis C antibody     CK total     IgG     IgM     IgA     Cryoglobulin quantitative     Centromere Antibody IgG     Immunoglobulin G subclasses     IgG     TSH with free T4 reflex     Lupus Anticoagulant Panel     Beta 2 Glycoprotein 1 Antibody IgG     Beta 2 Glycoprotein 1 Antibody IgM     Cardiolipin Paige IgG and IgM     HIV Antigen Antibody Combo     Tissue transglutaminase antibody IgA     Deamidated Giladin Peptide Paige IgA IgG     Endomysial Antibody IgA by IFA     Vitamin D Deficiency     Direct antiglobulin test (DATG)               Active Problem List:     Patient Active Problem List    Diagnosis Date Noted     Drug-induced systemic lupus erythematosus, unspecified organ involvement status (H) 02/26/2018     Priority: Medium            History of Present Illness:     Chief Complaint   Patient presents with     Consult     establish care with lupus       The patient is a 52 year old female who presents today as a self-referral for a history of SLE. She reports a long-standing history of fatigue, myalgias, arthralgias, weakness, paresthesias, sicca symptoms, Raynaud's (since her 30's), alopecia, and various rashes. She was found to have a positive HAN 1:160 (homogeneous pattern) and MPO (but negative ANCA) and was referred to rheumatology in 2/2017. She was initially evaluated by Dr. Hartman of [facility] and diagnosed with fibromyalgia, chronic pain syndrome, and osteoarthritis. Additional lab testing revealed positive lupus  "anticoagulant and low titer + aCL IgM, with negative dsDNA, Feliz, RF/CCP, ANCA, and normal C3/C4. She was treated with zofran as needed for nausea, doxepin at night for sleep, and celexa and tramadol for FMS symptoms.     The patient reports for many years she has had various symptoms that have largely  Been unexplained. She reports developing Raynaud's of her hands/feet in her 30's, describing color change to white then red with cold exposure, but also notes atypical symptoms of this happening in the summer as well. She denies having ulcers of the tips of fingers, but does think her fingers/toes have cracking skin. She also notes a long history of joint pain including most joints involving the feet, knees, hips (points anterior groin), hands, wrists, and shoulders. Symptoms seem to be more severe on L side. She denies swelling of the joints, but does note her hands will feel puffy and it is hard to get her rings on/off. She does have some morning stiffness lasting about an hour. She also notes both muscle pain and weakness \"all over,\" but specifically points out decreased  strength. She has noticed she's dropping objects. She does recall having EMG of her arms but thinks this was normal. She has chronic back pain and sciatica of her left leg.     She also notes various rashes that are heat sensitive (not actually photosensitive or just in areas of sun exposure), with a blistering rash on her chest, back, arms, and shoulders. She has never seen dermatology for this and not had this biopsied. She notes numbness/tingling \"all over\" that is lightning like, lasting only seconds and occurs intermittently without obvious trigger. She has ongoing sicca symptoms for many years. Follows with ophthalmology and uses AT as needed. Has never been on restasis or xiidra. She does think she had Schirmer's testing done at some point, but she is unclear. She does not wear contacts. Regarding her dry mouth, she drinks water " "frequently throughout the day. She does not use biotene products and has never been on evoxac or salagen. She did have to have many teeth pulled in the last 10 years due to poor dentition. She does have GERD symptoms mostly at night. Does describe oropharyngeal dysphagia. Uses ranitidine as needed which does help. Describes brain fog symptoms.     Has chronic abdominal bloating and constipation. Some diarrhea. Eats one meal per day but gaining weight. Has had a colonoscopy several years ago that she reports was normal. Some chronic sinus problems, frequent chest colds, epistaxis. Does report oral ulcers, tiny bumps along the upper front gums that she was told may have been thrush (but didn't improve with nystatin).     Describes intermittent \"dizzy\" episodes which she describes more as lightheadedness, SOB, and palpitations. These can occur at rest, not just with activity. She has not had this worked up by cardiology or her primary. Is in between primary care providers currently as recent one retired. Reports hx of tricuspid valvular disease, although she can't recall when her last TTE was.     Describes \"flares\" of many of the above symptoms, primarily arthralgias, myalgias, weakness that will last for several weeks then resolve on own (has never been on prednisone), occurring about 5x per year. She has never been on plaquenil. Has tried tylenol and ibuprofen with some help for muscle/joint pain. Naproxen didn't help.     Doesn't sleep well at night. Will wake up and not be able to fall back asleep due to pain. Doxepin does help her sleep some. Does not have routine exercise regimen.     Hx of 1 late 1st trimester/early 2nd trimester miscarriage that required a D&C. No hx of blood clots. No fam hx of recurrent miscarriage.            Review of Systems:   Complete ROS negative except for symptoms mentioned in the HPI            Past Medical History:   L shoulder adhesive capsulitis  Fibromyalgia  Chronic pain " "syndrome  Raynaud's phenomenon since age 30's          Social History:   Has 4 children - 2 younger that live in house and 2 out of house. Daughter at [employer] today   -  at [employer] today  Hasn't worked in 8 years due to her symptoms   Occasional EtOH use  Current smoker - 2 packs/week         Family History:     GF - CVA, recurrent thrombosis  MGM - t1DM (diagnosed in 30's)  PGM - RA         Allergies:     Allergies   Allergen Reactions     Cefaclor Hives            Medications:     Current Outpatient Prescriptions   Medication Sig Dispense Refill     doxepin (SINEQUAN) 10 MG/ML (HIGH CONC) solution 10 mg       pyridoxine (VITAMIN B-6) 50 MG TABS 50 mg       RaNITidine HCl (ZANTAC PO) Take by mouth 2 times daily       pilocarpine (SALAGEN) 5 MG tablet Take 1 tablet up to 3x daily for dry mouth 30 tablet 0            Physical Exam:   Blood pressure 138/79, pulse 65, temperature 98.2  F (36.8  C), temperature source Oral, height 1.638 m (5' 4.5\"), weight 68.2 kg (150 lb 6.4 oz).  Wt Readings from Last 4 Encounters:   02/26/18 68.2 kg (150 lb 6.4 oz)     BP Readings from Last 3 Encounters:   02/26/18 138/79       Constitutional: well-developed, appearing stated age  Eyes: PERRLA, normal conjunctiva, sclera  ENT: nl external ears, nose, lips. No mucous membrane lesions, slightly decreased salivary pool. Wearing partials on upper teeth.   Neck: no cervical or supraclavicular lymphadenopathy, no parotid swelling, normal palpable thyroid  Resp: CTAB, no wheezing, breathing comfortably on room air  CV: RRR, no m/r/g, no LE edema  GI: soft, NT/ND, +BS, no HSM  : not tested  Lymph: no cervical, supraclavicular or epitrochlear nodes  MS: All joints including shoulder, elbow, wrist, MCP/PIP/DIP, hip, knee, ankle, and foot MTP/PIP/DIP joints examined and found normal with full ROM and w/o synovitis or deformity other than that listed below:   Neck - no tenderness, FROM  Shoulder - L shoulder mild pain " anterior/posterior palpation and pain with ROM, but able to do. FROM in full abduction/adduction and internal/external rotation. No GH effusion/warmth. AC and SC joints without effusion or tenderness   Elbow - FROM and no joint effusion; nontender BL   Wrist - FROM and no joint effusion. Mild tenderness to wrists without synovitis  Hand - mild tenderness to MCP and PIP joints without synovitis. Some tenderness inter-articular between joints along phalanges without swelling. Able to make full fist and normal  strength  Back - no tenderness along spine  Pelvic - no tenderness along BL SI joints. HILLARY negative   Hip - FROM; nontender BL  Knee - FROM, no joint effusion or joint line tenderness  Ankle - FROM and no joint effusion, nontender   Foot - no focal pain or synovitis on palpation of the MTPs bilaterally, negative MTP squeeze. Does have well-healing scar along left lateral foot from recent surgery.   Spine and gait normal.   No active synovitis. Did have multiple tender points on exam - 16/18 FMS points.   No dactylitis,  tenosynovitis, enthesopathy.  Skin: no nail pitting; no nodules, no rash in exposed areas  Psych: nl judgement, orientation, memory, affect.  Neuro: CN II-XII grossly intact, moving all extremities equally, normal gait. Strength BL shoulder abduction 5/5, BL hip flexors 5/5         Data:     No results found for this or any previous visit.    Bailey Root MD    Lab Results   Component Value Date    WBC 6.3 02/26/2018     Lab Results   Component Value Date    RBC 4.30 02/26/2018     Lab Results   Component Value Date    HGB 13.5 02/26/2018     Lab Results   Component Value Date    HCT 41.7 02/26/2018     No components found for: MCT  Lab Results   Component Value Date    MCV 97 02/26/2018     Lab Results   Component Value Date    MCH 31.4 02/26/2018     Lab Results   Component Value Date    MCHC 32.4 02/26/2018     Lab Results   Component Value Date    RDW 12.7 02/26/2018     Lab  "Results   Component Value Date     02/26/2018       Lab Results   Component Value Date    AST 17 02/26/2018     Lab Results   Component Value Date    ALT 19 02/26/2018     No results found for: BILICONJ   Lab Results   Component Value Date    BILITOTAL 0.2 02/26/2018     Lab Results   Component Value Date    ALBUMIN 3.8 02/26/2018     Lab Results   Component Value Date    PROTTOTAL 7.8 02/26/2018      Lab Results   Component Value Date    ALKPHOS 128 02/26/2018       Last Basic Metabolic Panel:  Lab Results   Component Value Date     02/26/2018      Lab Results   Component Value Date    POTASSIUM 3.8 02/26/2018     Lab Results   Component Value Date    CHLORIDE 105 02/26/2018     Lab Results   Component Value Date    DAVY 8.9 02/26/2018     Lab Results   Component Value Date    CO2 30 02/26/2018     Lab Results   Component Value Date    BUN 10 02/26/2018     Lab Results   Component Value Date    CR 0.77 02/26/2018     Lab Results   Component Value Date    GLC 73 02/26/2018     TTE reportedly last in 2008, there was partial report pasted in prior rheumatology note:  \"Echo in 2008, obtained for history of tricuspid valve disease, showed an EF of 88%, normal LV, normal aorta, mild-to-moderate leaflet thickening of the mitral valve with trivial regurgitation, and small pericardial effusion, circumferential to the heart. Repeat evaluation was recommended in 3 months, and this was not done.\"    Prior workup:  Positive HAN x2, 1:80 and 1:160 (homogeneous)  Low titer +aCL IgM 38  Positive LAC  Negative Sm, dsDNA, aCL IgG, B2GP IgG/IgM  Normal CK, TSH, vitamin D   Negative ANCA  Normal C3, C4  OK Cr, CBC    MRI L shoulder: findings concerning for possible adhesive capsulitis     Reviewed all relevant data including labs and imaging.   "

## 2018-03-01 LAB
GLIADIN IGA SER-ACNC: 1 U/ML
GLIADIN IGG SER-ACNC: <1 U/ML
TTG IGA SER-ACNC: <1 U/ML

## 2018-03-02 LAB — ENDOMYSIUM IGA TITR SER IF: NORMAL {TITER}

## 2018-03-04 ENCOUNTER — HEALTH MAINTENANCE LETTER (OUTPATIENT)
Age: 53
End: 2018-03-04

## 2018-03-05 LAB — CRYOGLOB SER QL: ABNORMAL %

## 2018-03-07 ENCOUNTER — TELEPHONE (OUTPATIENT)
Dept: RHEUMATOLOGY | Facility: CLINIC | Age: 53
End: 2018-03-07

## 2018-03-07 DIAGNOSIS — M35.09 SJOGREN'S SYNDROME WITH OTHER ORGAN INVOLVEMENT (H): Primary | ICD-10-CM

## 2018-03-07 LAB — ANCA IGG TITR SER IF: NORMAL {TITER}

## 2018-03-07 RX ORDER — HYDROXYCHLOROQUINE SULFATE 200 MG/1
200 TABLET, FILM COATED ORAL 2 TIMES DAILY
Qty: 60 TABLET | Refills: 3 | Status: SHIPPED | OUTPATIENT
Start: 2018-03-07 | End: 2018-05-21

## 2018-03-07 NOTE — TELEPHONE ENCOUNTER
Called Ms. Light to discuss her lab results. Has hx of +HAN and LAC. My additional lab testing shows +SSA, trace cryo, positive LAC, and low titer positive B2GP IgM. I do think she has mild Sjogren's syndrome (vs SLE) at this time. As she has significant fatigue, flu-like symptoms, and arthralgias, I did discuss role for plaquenil and she is interested in taking. Discussed risk/benefit and she is in agreement to start. Also asked her to see Sjogren's foundation website.   Glenna Root

## 2018-03-15 RX ORDER — LANOLIN ALCOHOL/MO/W.PET/CERES
50 CREAM (GRAM) TOPICAL DAILY
Qty: 100 TABLET | OUTPATIENT
Start: 2018-03-15

## 2018-03-15 NOTE — TELEPHONE ENCOUNTER
pyridoxine (VITAMIN B-6) 50 MG TABS  Last Written Prescription Date:  unknown  Last Fill Quantity: unknown,   # refills: unknwon  Last Office Visit : 2/26/18  Future Office visit: 6/25/18      Routing refill request to provider for review/approval because: on med list as historical. Hx:   Received from: [facility] Received Sig: Take 1 tablet (50 mg) by mouth 1 time per day

## 2018-04-09 ENCOUNTER — MYC REFILL (OUTPATIENT)
Dept: RHEUMATOLOGY | Facility: CLINIC | Age: 53
End: 2018-04-09

## 2018-04-09 DIAGNOSIS — M35.09 SJOGREN'S SYNDROME WITH OTHER ORGAN INVOLVEMENT (H): ICD-10-CM

## 2018-04-09 DIAGNOSIS — M35.00 SICCA, UNSPECIFIED TYPE (H): ICD-10-CM

## 2018-04-09 RX ORDER — HYDROXYCHLOROQUINE SULFATE 200 MG/1
200 TABLET, FILM COATED ORAL 2 TIMES DAILY
Qty: 60 TABLET | Refills: 3 | OUTPATIENT
Start: 2018-04-09

## 2018-04-09 RX ORDER — PILOCARPINE HYDROCHLORIDE 5 MG/1
TABLET, FILM COATED ORAL
Qty: 90 TABLET | Refills: 1 | Status: SHIPPED | OUTPATIENT
Start: 2018-04-09 | End: 2018-05-21

## 2018-05-21 ENCOUNTER — OFFICE VISIT (OUTPATIENT)
Dept: RHEUMATOLOGY | Facility: CLINIC | Age: 53
End: 2018-05-21
Attending: INTERNAL MEDICINE
Payer: COMMERCIAL

## 2018-05-21 VITALS
OXYGEN SATURATION: 97 % | HEART RATE: 57 BPM | WEIGHT: 150.1 LBS | BODY MASS INDEX: 25.37 KG/M2 | SYSTOLIC BLOOD PRESSURE: 135 MMHG | TEMPERATURE: 98.5 F | DIASTOLIC BLOOD PRESSURE: 92 MMHG

## 2018-05-21 DIAGNOSIS — M85.80 OSTEOPENIA, UNSPECIFIED LOCATION: ICD-10-CM

## 2018-05-21 DIAGNOSIS — Z79.899 HIGH RISK MEDICATION USE: ICD-10-CM

## 2018-05-21 DIAGNOSIS — R42 DIZZINESS: ICD-10-CM

## 2018-05-21 DIAGNOSIS — M32.19 OTHER SYSTEMIC LUPUS ERYTHEMATOSUS WITH OTHER ORGAN INVOLVEMENT (H): Primary | ICD-10-CM

## 2018-05-21 DIAGNOSIS — M35.09 SJOGREN'S SYNDROME WITH OTHER ORGAN INVOLVEMENT (H): ICD-10-CM

## 2018-05-21 DIAGNOSIS — K12.0 APHTHOUS ULCER: ICD-10-CM

## 2018-05-21 DIAGNOSIS — M35.00 SICCA, UNSPECIFIED TYPE (H): ICD-10-CM

## 2018-05-21 PROCEDURE — G0463 HOSPITAL OUTPT CLINIC VISIT: HCPCS | Mod: ZF

## 2018-05-21 RX ORDER — TRIAMCINOLONE ACETONIDE 1 MG/G
OINTMENT TOPICAL
Qty: 15 G | Refills: 1 | Status: SHIPPED | OUTPATIENT
Start: 2018-05-21 | End: 2018-05-21

## 2018-05-21 RX ORDER — HYDROXYCHLOROQUINE SULFATE 200 MG/1
200 TABLET, FILM COATED ORAL 2 TIMES DAILY
Qty: 180 TABLET | Refills: 3 | Status: SHIPPED | OUTPATIENT
Start: 2018-05-21

## 2018-05-21 RX ORDER — TRIAMCINOLONE ACETONIDE 0.1 %
PASTE (GRAM) DENTAL 2 TIMES DAILY
Qty: 5 G | Refills: 1 | Status: SHIPPED | OUTPATIENT
Start: 2018-05-21

## 2018-05-21 RX ORDER — PILOCARPINE HYDROCHLORIDE 5 MG/1
TABLET, FILM COATED ORAL
Qty: 180 TABLET | Refills: 2 | Status: SHIPPED | OUTPATIENT
Start: 2018-05-21

## 2018-05-21 ASSESSMENT — PAIN SCALES - GENERAL: PAINLEVEL: MODERATE PAIN (5)

## 2018-05-21 NOTE — MR AVS SNAPSHOT
After Visit Summary   5/21/2018    Danika Light    MRN: [id number]           Patient Information     Date Of Birth          1965        Visit Information        Provider Department      5/21/2018 10:00 AM Bailey Root MD [facility] Rheumatology        Today's Diagnoses     Sjogren's syndrome with other organ involvement (H)          Care Instructions    Continue plaquenil  Return in 6 months with lab with Dr. Villeda  See ophthalmologist  See dentist  Can try biotene products, frequent water. Can use salagen up to 4x per day   See dermatologist regarding rash - consider biopsy if there is any concern for autoimmune skin disease            Follow-ups after your visit        Follow-up notes from your care team     Return in about 6 months (around 11/21/2018).      Your next 10 appointments already scheduled     Nov 14, 2018 10:00 AM CST   (Arrive by 9:45 AM)   RETURN LUPUS with Saji Villeda MD   [facility] Rheumatology ([facility])    [address] [place] [address]  [address]  [facility] [address] [phone]              Who to contact     If you have questions or need follow up information about today's clinic visit or your schedule please contact [facility] RHEUMATOLOGY directly at [phone].  Normal or non-critical lab and imaging results will be communicated to you by MyChart, letter or phone within 4 business days after the clinic has received the results. If you do not hear from us within 7 days, please contact the clinic through [employer] or phone. If you have a critical or abnormal lab result, we will notify you by phone as soon as possible.  Submit refill requests through [email or web address] or call your pharmacy and they will forward the refill request to us. Please allow 3 business days for your refill to be completed.          Additional Information About Your Visit        [employer] Information     [email or web address] gives you secure access to your electronic health record. If you  see a primary care provider, you can also send messages to your care team and make appointments. If you have questions, please call your primary care clinic.  If you do not have a primary care provider, please call [phone] and they will assist you.        Care EveryWhere ID     This is your Care EveryWhere ID. This could be used by other organizations to access your [facility] medical records  [id number]        Your Vitals Were     Pulse Temperature Pulse Oximetry BMI (Body Mass Index)          57 98.5  F (36.9  C) (Oral) 97% 25.37 kg/m2         Blood Pressure from Last 3 Encounters:   05/21/18 (!) 135/92   02/26/18 138/79    Weight from Last 3 Encounters:   05/21/18 68.1 kg (150 lb 1.6 oz)   02/26/18 68.2 kg (150 lb 6.4 oz)              Today, you had the following     No orders found for display         Where to get your medicines      These medications were sent to [id number] - [place], [place] - [address] [place] [address]    Hours:  Test fax successful 5/28/03  Phone:  [phone]     hydroxychloroquine 200 MG tablet          Primary Care Provider Office Phone # Fax #    Jose Alejandro Obregon [phone]       [address]  [place] [address]        Equal Access to Services     DEEDEE TANG AH: Hadii aad ku hadasho Soomaali, waaxda luqadaha, qaybta kaalmada deannayada, romario najera. So [facility] [phone].    ATENCIÓN: Si habla español, tiene a raymond disposición servicios gratuitos de asistencia lingüística. Darrell al [phone].    We comply with applicable federal civil rights laws and [place] laws. We do not discriminate on the basis of race, color, national origin, age, disability, sex, sexual orientation, or gender identity.            Thank you!     Thank you for choosing [facility] RHEUMATOLOGY  for your care. Our goal is always to provide you with excellent care. Hearing back from our patients is one way  we can continue to improve our services. Please take a few minutes to complete the written survey that you may receive in the mail after your visit with us. Thank you!             Your Updated Medication List - Protect others around you: Learn how to safely use, store and throw away your medicines at www.disposemymeds.org.          This list is accurate as of 5/21/18 11:49 AM.  Always use your most recent med list.                   Brand Name Dispense Instructions for use Diagnosis    doxepin 10 MG/ML (HIGH CONC) solution    SINEquan     10 mg        hydroxychloroquine 200 MG tablet    PLAQUENIL    180 tablet    Take 1 tablet (200 mg) by mouth 2 times daily    Sjogren's syndrome with other organ involvement (H)       pilocarpine 5 MG tablet    SALAGEN    90 tablet    Take 1 tablet up to 3x daily for dry mouth    Sicca, unspecified type (H)       pyridoxine 50 MG Tabs    VITAMIN B-6     50 mg        ZANTAC PO      Take by mouth 2 times daily

## 2018-05-21 NOTE — LETTER
5/21/2018      RE: Danika Light  [address] [facility] [address]       [facility] - Rheumatology Clinic Visit     Danika Light MRN# [id number]   YOB: 1965    Primary care provider: Dr. 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 No hx DVT/PE or recurrent blood clot or miscarriages in family. Possible hx of first trimester miscarriages in pt but these were unconfirmed. There are no guidelines regarding prophylactic/preventative anticoagulation in pts with APS based on pregnancy morbidity. Could consider ASA 81 mg qd addition.   # Fibromyalgia   # scaly plaques on left hip   # osteopenia - T score - 1.6 of L femur 1/2016  # hx of ?tricuspid valvular abnormality, do not see prior TTE  # hx of L shoulder adhesive capsulitis, significantly improved with PT  # vitamin D deficiency     Patient is a 53 year old female with likely SLE (vs Sjogren's syndrome) with primarily mucocutaneous and joint manifestations, moderately improved after 2 months of HCQ use. Pt does meet SLICC criteria for SLE with +HAN, SSA, [id number], and LAC along with oral ulcers, non-scarring alopecia, and arthralgias. She has no evidence of severe organ damage, with recently normal blood counts, liver and kidney tests. I encouraged her to continue HCQ use for potential prevention of disease progression and for treatment of her fatigue and joint symptoms. I did encourage her to follow up with ophthalmology for dry eyes as she may benefit from addition of restasis or xiidra in addition to her preservative free artificial tears. I also encouraged her to follow up with  dentist given sicca-associated dental disease. I see no need to further increase immunosuppression at this time. If joint symptoms continue to be large issue and concern they are progressing (and not part of her FMS), could consider addition of AZA vs MTX.     Recs:  - continue  mg qd  - follow up with ophthalmology. Needs at annual OCT screenings. Ask ophthalmology about restasis or xiidra  - continue biotene products as needed  - follow up with dentist   - continue salagen 5 mg up to 3-4x per day PRN for dry mouth   - follow up with dermatology regarding left hip rash. Looks to me like tinea corporis, less like a CTD-associated rash. If dermatology is concerned to CTD rash would encourage biopsy   - consider formal neuropsych testing for brain fog   - trial kenalog paste for oral ulcers  - disease monitoring labs at her next visit - CBC w/ diff, LFT, Cr, urinalysis, complements, dsDNA, ESR/CRP  - discussed importance of avoiding sun exposure given her autoimmunity could be triggered by sun  - encourage smoking cessation, as this can continue to trigger autoimmunity   - there was previous mention of a potential valvular abnormality? But I see no TTE through [facility]. I would recommend a repeat TTE. Order should be faxed to [facility]  - encourage Ca/vit D supplementation given osteopenia. It has been >2 years since her last DEXA, would be worthwhile to repeat. Have ordered, will fax order to [facility]. Need to calculate FRAX to determine if bisphosphonate warranted. In either case, should stop smoking and incorporate weight-bearing exercise.    - for FMS - trying to incorporate routine exercise regimen, routine stretching such as yves chi and/or yoga, improving sleep quality. May benefit from muscle relaxer at night such as flexeril, but would ask this be initiated/managed by her PCP. She is already on doxepin with some improvement.     Health maintenance  HBV: neg   HCV: neg  HIV:  neg  TB: needs to be  checked  Vitamin D and calcium/bone health: Vit D normal 1/2017. Recheck today  Cancer screenings (skin, breast, colon, pap): mammogram, colonoscopy UTD  Immunizations: would encourage shingrix vaccine through PCP (or here at her next visit)    RTC in 4-6 mo with Dr. Villeda (or new fellow)    Patient was seen and discussed with Dr. Morin.     Bailey Root MD  Rheumatology Fellow  Pager [phone]          Active Problem List:     Patient Active Problem List    Diagnosis Date Noted     Sjogren's syndrome (H) 02/28/2018     Priority: Medium            History of Present Illness:     Chief Complaint   Patient presents with     RECHECK     4 month  follow up lupus     Initial rheumatological visit 2/2018  The patient is a 52 year old female who presents today as a self-referral for a history of SLE. She reports a long-standing history of fatigue, myalgias, arthralgias, weakness, paresthesias, sicca symptoms, Raynaud's (since her 30's), alopecia, and various rashes. She was found to have a positive HAN 1:160 (homogeneous pattern) and MPO (but negative ANCA) and was referred to rheumatology in 2/2017. She was initially evaluated by Dr. Hartman of [facility] and diagnosed with fibromyalgia, chronic pain syndrome, and osteoarthritis. Additional lab testing revealed positive lupus anticoagulant and low titer + aCL IgM, with negative dsDNA, Feliz, RF/CCP, ANCA, and normal C3/C4. She was treated with zofran as needed for nausea, doxepin at night for sleep, and celexa and tramadol for FMS symptoms.     The patient reports for many years she has had various symptoms that have largely  Been unexplained. She reports developing Raynaud's of her hands/feet in her 30's, describing color change to white then red with cold exposure, but also notes atypical symptoms of this happening in the summer as well. She denies having ulcers of the tips of fingers, but does think her fingers/toes have cracking skin. She also  "notes a long history of joint pain including most joints involving the feet, knees, hips (points anterior groin), hands, wrists, and shoulders. Symptoms seem to be more severe on L side. She denies swelling of the joints, but does note her hands will feel puffy and it is hard to get her rings on/off. She does have some morning stiffness lasting about an hour. She also notes both muscle pain and weakness \"all over,\" but specifically points out decreased  strength. She has noticed she's dropping objects. She does recall having EMG of her arms but thinks this was normal. She has chronic back pain and sciatica of her left leg.     She also notes various rashes that are heat sensitive (not actually photosensitive or just in areas of sun exposure), with a blistering rash on her chest, back, arms, and shoulders. She has never seen dermatology for this and not had this biopsied. She notes numbness/tingling \"all over\" that is lightning like, lasting only seconds and occurs intermittently without obvious trigger. She has ongoing sicca symptoms for many years. Follows with ophthalmology and uses AT as needed. Has never been on restasis or xiidra. She does think she had Schirmer's testing done at some point, but she is unclear. She does not wear contacts. Regarding her dry mouth, she drinks water frequently throughout the day. She does not use biotene products and has never been on evoxac or salagen. She did have to have many teeth pulled in the last 10 years due to poor dentition. She does have GERD symptoms mostly at night. Does describe oropharyngeal dysphagia. Uses ranitidine as needed which does help. Describes brain fog symptoms.     Has chronic abdominal bloating and constipation. Some diarrhea. Eats one meal per day but gaining weight. Has had a colonoscopy several years ago that she reports was normal. Some chronic sinus problems, frequent chest colds, epistaxis. Does report oral ulcers, tiny bumps along the upper " "front gums that she was told may have been thrush (but didn't improve with nystatin).     Describes intermittent \"dizzy\" episodes which she describes more as lightheadedness, SOB, and palpitations. These can occur at rest, not just with activity. She has not had this worked up by cardiology or her primary. Is in between primary care providers currently as recent one retired. Reports hx of tricuspid valvular disease, although she can't recall when her last TTE was.     Describes \"flares\" of many of the above symptoms, primarily arthralgias, myalgias, weakness that will last for several weeks then resolve on own (has never been on prednisone), occurring about 5x per year. She has never been on plaquenil. Has tried tylenol and ibuprofen with some help for muscle/joint pain. Naproxen didn't help.     Doesn't sleep well at night. Will wake up and not be able to fall back asleep due to pain. Doxepin does help her sleep some. Does not have routine exercise regimen.     Hx of 1 late 1st trimester/early 2nd trimester miscarriage that required a D&C. No hx of blood clots. No fam hx of recurrent miscarriage.     Interim hx 5/21/2018:  She is doing well today. Has been on plaquenil for several months now and does think it has helped her joint pain and flu like symptoms. Has felt some viral symptoms for past 1.5 weeks with flu like symptoms, feeling run down, fatigued, increased arthralgias and myalgias. She does not her plaquenil was changed from one brand to another and she wonders if her symptoms could be due to that. Does have oral ulcer today on her left cheek and upper gumline. Continues to have sicca symptoms. Does think salagen has helped her dry mouth. Hasn't seen dentist. Feels as though she has gritty sensation in eye. Using AT. Seeing ophthalmology soon. Some diffuse joint symptoms still but no joint swelling. Raynaud's mild and only when cold, not when reaches into freezer/fridge. No digital ulcerations. Occasional " SOB. No pleuritis. No cough. Still taking doxepin at night which helps her sleep some.            Review of Systems:   Complete ROS negative except for symptoms mentioned in the HPI            Past Medical History:   L shoulder adhesive capsulitis  Fibromyalgia  Chronic pain syndrome  Raynaud's phenomenon since age 30's          Social History:   Has 4 children - 2 younger that live in house and 2 out of house.    -  at [employer] today  Hasn't worked in 8 years due to her symptoms - now working part time at a [employer] on feet for several hours at a time  Occasional EtOH use  Current smoker - 2 packs/week         Family History:     GF - CVA, recurrent thrombosis  MGM - t1DM (diagnosed in 30's)  PGM - RA         Allergies:     Allergies   Allergen Reactions     Cefaclor Hives            Medications:     Current Outpatient Prescriptions   Medication Sig Dispense Refill     doxepin (SINEQUAN) 10 MG/ML (HIGH CONC) solution 10 mg       hydroxychloroquine (PLAQUENIL) 200 MG tablet Take 1 tablet (200 mg) by mouth 2 times daily 180 tablet 3     pilocarpine (SALAGEN) 5 MG tablet Take 1 tablet up to 3x daily for dry mouth 90 tablet 1     pyridoxine (VITAMIN B-6) 50 MG TABS 50 mg       RaNITidine HCl (ZANTAC PO) Take by mouth 2 times daily              Physical Exam:   Blood pressure (!) 135/92, pulse 57, temperature 98.5  F (36.9  C), temperature source Oral, weight 68.1 kg (150 lb 1.6 oz), SpO2 97 %.  Wt Readings from Last 4 Encounters:   05/21/18 68.1 kg (150 lb 1.6 oz)   02/26/18 68.2 kg (150 lb 6.4 oz)     BP Readings from Last 3 Encounters:   05/21/18 (!) 135/92   02/26/18 138/79       Constitutional: well-developed, appearing stated age  Eyes: PERRLA, normal conjunctiva, sclera  ENT: nl external ears, nose, lips. No mucous membrane lesions, slightly decreased salivary pool. Wearing partials on upper teeth.   Neck: no cervical or supraclavicular lymphadenopathy, no parotid swelling, normal palpable  thyroid  Resp: CTAB, no wheezing, breathing comfortably on room air  CV: RRR, no m/r/g, no LE edema  GI: soft, NT/ND, +BS, no HSM  : not tested  Lymph: no cervical, supraclavicular or epitrochlear nodes  MS: All joints including shoulder, elbow, wrist, MCP/PIP/DIP, hip, knee, ankle, and foot MTP/PIP/DIP joints examined and found normal with full ROM and w/o synovitis or deformity. Previously with 16/18 FMS tender spots on exam.   No dactylitis,  tenosynovitis, enthesopathy.  Skin: no nail pitting; no nodules, overall tanned appearance of skin. Left upper lateral thigh with regan 5 cm red scaly plaque with raised red serpigenous border.   Psych: nl judgement, orientation, memory, affect.  Neuro: CN II-XII grossly intact, moving all extremities equally, normal gait. Strength grossly normal.          Data:     Results for orders placed or performed in visit on 02/26/18   Complement C3   Result Value Ref Range    Complement C3 111 76 - 169 mg/dL   Complement C4   Result Value Ref Range    Complement C4 20 15 - 50 mg/dL   DNA double stranded antibodies   Result Value Ref Range    DNA-ds 1 <10 IU/mL   ALBINO Panel (RNP, SMITH, Scleroderma, SSAB, SSBB); ALBINO, Antibodies, Panel   Result Value Ref Range    RNP Antibody IgG <0.2 0.0 - 0.9 AI    Feliz ALBINO Antibody IgG <0.2 0.0 - 0.9 AI    SSA (Ro) (ALBINO) Antibody, IgG 1.8 (H) 0.0 - 0.9 AI    SSB (La) (ALBINO) Antibody, IgG 0.4 0.0 - 0.9 AI    Scleroderma Antibody Scl-70 ALBINO IgG 0.2 0.0 - 0.9 AI   Rheumatoid factor   Result Value Ref Range    Rheumatoid Factor <20 <20 IU/mL   Routine UA with microscopic   Result Value Ref Range    Color Urine Yellow     Appearance Urine Clear     Glucose Urine Negative NEG^Negative mg/dL    Bilirubin Urine Negative NEG^Negative    Ketones Urine Negative NEG^Negative mg/dL    Specific Gravity Urine 1.014 1.003 - 1.035    Blood Urine Negative NEG^Negative    pH Urine 7.0 5.0 - 7.0 pH    Protein Albumin Urine Negative NEG^Negative mg/dL    Urobilinogen  mg/dL 0.0 0.0 - 2.0 mg/dL    Nitrite Urine Negative NEG^Negative    Leukocyte Esterase Urine Negative NEG^Negative    Source Midstream Urine     WBC Urine <1 0 - 2 /HPF    RBC Urine <1 0 - 2 /HPF    Squamous Epithelial /HPF Urine <1 0 - 1 /HPF    Mucous Urine Present (A) NEG^Negative /LPF   Basic metabolic panel   Result Value Ref Range    Sodium 140 133 - 144 mmol/L    Potassium 3.8 3.4 - 5.3 mmol/L    Chloride 105 94 - 109 mmol/L    Carbon Dioxide 30 20 - 32 mmol/L    Anion Gap 5 3 - 14 mmol/L    Glucose 73 70 - 99 mg/dL    Urea Nitrogen 10 7 - 30 mg/dL    Creatinine 0.77 0.52 - 1.04 mg/dL    GFR Estimate 78 >60 mL/min/1.7m2    GFR Estimate If Black >90 >60 mL/min/1.7m2    Calcium 8.9 8.5 - 10.1 mg/dL   CBC with platelets differential   Result Value Ref Range    WBC 6.3 4.0 - 11.0 10e9/L    RBC Count 4.30 3.8 - 5.2 10e12/L    Hemoglobin 13.5 11.7 - 15.7 g/dL    Hematocrit 41.7 35.0 - 47.0 %    MCV 97 78 - 100 fl    MCH 31.4 26.5 - 33.0 pg    MCHC 32.4 31.5 - 36.5 g/dL    RDW 12.7 10.0 - 15.0 %    Platelet Count 162 150 - 450 10e9/L    Diff Method Automated Method     % Neutrophils 46.2 %    % Lymphocytes 35.4 %    % Monocytes 11.6 %    % Eosinophils 5.6 %    % Basophils 1.0 %    % Immature Granulocytes 0.2 %    Nucleated RBCs 0 0 /100    Absolute Neutrophil 2.9 1.6 - 8.3 10e9/L    Absolute Lymphocytes 2.2 0.8 - 5.3 10e9/L    Absolute Monocytes 0.7 0.0 - 1.3 10e9/L    Absolute Eosinophils 0.4 0.0 - 0.7 10e9/L    Absolute Basophils 0.1 0.0 - 0.2 10e9/L    Abs Immature Granulocytes 0.0 0 - 0.4 10e9/L    Absolute Nucleated RBC 0.0    Hepatic panel   Result Value Ref Range    Bilirubin Direct <0.1 0.0 - 0.2 mg/dL    Bilirubin Total 0.2 0.2 - 1.3 mg/dL    Albumin 3.8 3.4 - 5.0 g/dL    Protein Total 7.8 6.8 - 8.8 g/dL    Alkaline Phosphatase 128 40 - 150 U/L    ALT 19 0 - 50 U/L    AST 17 0 - 45 U/L   Hepatitis B core antibody   Result Value Ref Range    Hepatitis B Core Paige Nonreactive NR^Nonreactive   Hepatitis B  surface antigen   Result Value Ref Range    Hep B Surface Agn Nonreactive NR^Nonreactive   Hepatitis C antibody   Result Value Ref Range    Hepatitis C Antibody Nonreactive NR^Nonreactive   CK total   Result Value Ref Range    CK Total 87 30 - 225 U/L   IgM   Result Value Ref Range    IGM 92 60 - 265 mg/dL   IgA   Result Value Ref Range     70 - 380 mg/dL   Cryoglobulin quantitative   Result Value Ref Range    Cryoglobulin Trace (A) NEG^Negative %   Centromere Antibody IgG   Result Value Ref Range    Centromere Antibody IgG <0.2 0.0 - 0.9 AI   Immunoglobulin G subclasses   Result Value Ref Range    IGG 1090 695 - 1620 mg/dL    IgG1 454 300 - 856 mg/dL    IgG2 393 158 - 761 mg/dL    IgG3 107 24 - 192 mg/dL    IgG4 57 11 - 86 mg/dL   TSH with free T4 reflex   Result Value Ref Range    TSH 1.03 0.40 - 4.00 mU/L   Lupus Anticoagulant Panel   Result Value Ref Range    Lupus Result Positive (A) NEG^Negative   Beta 2 Glycoprotein 1 Antibody IgG   Result Value Ref Range    Beta 2 Glycoprotein 1 Antibody IgG <0.6 <7 U/mL   Beta 2 Glycoprotein 1 Antibody IgM   Result Value Ref Range    Beta 2 Glycoprotein 1 Antibody IgM 8.2 (H) <7 U/mL   Cardiolipin Paige IgG and IgM   Result Value Ref Range    Cardiolipin Antibody IgG <1.6 0.0 - 19.9 GPL-U/mL    Cardiolipin Antibody IgM 8.5 0.0 - 19.9 MPL-U/mL   HIV Antigen Antibody Combo   Result Value Ref Range    HIV Antigen Antibody Combo Nonreactive NR^Nonreactive       Antineutrophil cytoplasmic Paige IgG   Result Value Ref Range    Neutrophil Cytoplasmic IgG Antibody <1:20 <1:20   Deamidated Gliadin Peptide Paige IgA IgG   Result Value Ref Range    Deamidated Gliadin Paige, IgA 1 <7 U/mL    Deamidated Gliadin Paige, IgG <1 <7 U/mL   Endomysial Antibody IgA by IFA   Result Value Ref Range    Endomysial Antibody IgA by IFA <1:10 <1:10   Tissue transglutaminase antibody IgA   Result Value Ref Range    Tissue Transglutaminase Antibody IgA <1 <7 U/mL   Vitamin D Deficiency   Result Value Ref  "Range    Vitamin D Deficiency screening 32 20 - 75 ug/L   Direct antiglobulin test (DATG)   Result Value Ref Range    MELODY  Broad Spectrum Neg        Bailey Root MD    Lab Results   Component Value Date    WBC 6.3 02/26/2018     Lab Results   Component Value Date    RBC 4.30 02/26/2018     Lab Results   Component Value Date    HGB 13.5 02/26/2018     Lab Results   Component Value Date    HCT 41.7 02/26/2018     No components found for: MCT  Lab Results   Component Value Date    MCV 97 02/26/2018     Lab Results   Component Value Date    MCH 31.4 02/26/2018     Lab Results   Component Value Date    MCHC 32.4 02/26/2018     Lab Results   Component Value Date    RDW 12.7 02/26/2018     Lab Results   Component Value Date     02/26/2018       Lab Results   Component Value Date    AST 17 02/26/2018     Lab Results   Component Value Date    ALT 19 02/26/2018     No results found for: BILICONJ   Lab Results   Component Value Date    BILITOTAL 0.2 02/26/2018     Lab Results   Component Value Date    ALBUMIN 3.8 02/26/2018     Lab Results   Component Value Date    PROTTOTAL 7.8 02/26/2018      Lab Results   Component Value Date    ALKPHOS 128 02/26/2018       Last Basic Metabolic Panel:  Lab Results   Component Value Date     02/26/2018      Lab Results   Component Value Date    POTASSIUM 3.8 02/26/2018     Lab Results   Component Value Date    CHLORIDE 105 02/26/2018     Lab Results   Component Value Date    DAVY 8.9 02/26/2018     Lab Results   Component Value Date    CO2 30 02/26/2018     Lab Results   Component Value Date    BUN 10 02/26/2018     Lab Results   Component Value Date    CR 0.77 02/26/2018     Lab Results   Component Value Date    GLC 73 02/26/2018     TTE reportedly last in 2008, there was partial report pasted in prior rheumatology note:  \"Echo in 2008, obtained for history of tricuspid valve disease, showed an EF of 88%, normal LV, normal aorta, mild-to-moderate leaflet thickening of " "the mitral valve with trivial regurgitation, and small pericardial effusion, circumferential to the heart. Repeat evaluation was recommended in 3 months, and this was not done.\"    Labs:  Positive HAN x2, 1:80 and 1:160 (homogeneous)  Positive SSA  Low titer +aCL IgM 38 -> repeat negative   Positive LAC -> repeat positive   Negative Sm, dsDNA, aCL IgG, B2GP IgG/IgM -> B2GP IgM now low level positive  Normal CK, TSH, vitamin D   Negative ANCA  Normal C3, C4 -> repeat normal   OK Cr, CBC    MRI L shoulder: findings concerning for possible adhesive capsulitis     Reviewed all relevant data including labs and imaging.     Bailey Root MD      "

## 2018-05-21 NOTE — PATIENT INSTRUCTIONS
Continue plaquenil  Return in 6 months with lab with Dr. Villeda  See ophthalmologist  See dentist  Can try biotene products, frequent water. Can use salagen up to 4x per day   See dermatologist regarding rash - consider biopsy if there is any concern for autoimmune skin disease

## 2018-05-21 NOTE — NURSING NOTE
Chief Complaint   Patient presents with     RECHECK     4 month  follow up lupus   Wt 68.1 kg (150 lb 1.6 oz)  BMI 25.37 kg/m2   Manuela Hilliard

## 2018-05-22 NOTE — PROGRESS NOTES
[facility] - Rheumatology Clinic Visit     Danika Light MRN# [id number]   YOB: 1965    Primary care provider: Dr. 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 No hx DVT/PE or recurrent blood clot or miscarriages in family. Possible hx of first trimester miscarriages in pt but these were unconfirmed. There are no guidelines regarding prophylactic/preventative anticoagulation in pts with APS based on pregnancy morbidity. Could consider ASA 81 mg qd addition.   # Fibromyalgia   # scaly plaques on left hip   # osteopenia - T score - 1.6 of L femur 1/2016  # hx of ?tricuspid valvular abnormality, do not see prior TTE  # hx of L shoulder adhesive capsulitis, significantly improved with PT  # vitamin D deficiency     Patient is a 53 year old female with likely SLE (vs Sjogren's syndrome) with primarily mucocutaneous and joint manifestations, moderately improved after 2 months of HCQ use. Pt does meet SLICC criteria for SLE with +HAN, SSA, [id number], and LAC along with oral ulcers, non-scarring alopecia, and arthralgias. She has no evidence of severe organ damage, with recently normal blood counts, liver and kidney tests. I encouraged her to continue HCQ use for potential prevention of disease progression and for treatment of her fatigue and joint symptoms. I did encourage her to follow up with ophthalmology for dry eyes as she may benefit from addition of restasis or xiidra in addition to her preservative free artificial tears. I also encouraged her to follow up with dentist given sicca-associated dental disease. I see no need to further  increase immunosuppression at this time. If joint symptoms continue to be large issue and concern they are progressing (and not part of her FMS), could consider addition of AZA vs MTX.     Recs:  - continue  mg qd  - follow up with ophthalmology. Needs at annual OCT screenings. Ask ophthalmology about restasis or xiidra  - continue biotene products as needed  - follow up with dentist   - continue salagen 5 mg up to 3-4x per day PRN for dry mouth   - follow up with dermatology regarding left hip rash. Looks to me like tinea corporis, less like a CTD-associated rash. If dermatology is concerned to CTD rash would encourage biopsy   - consider formal neuropsych testing for brain fog   - trial kenalog paste for oral ulcers  - disease monitoring labs at her next visit - CBC w/ diff, LFT, Cr, urinalysis, complements, dsDNA, ESR/CRP  - discussed importance of avoiding sun exposure given her autoimmunity could be triggered by sun  - encourage smoking cessation, as this can continue to trigger autoimmunity   - there was previous mention of a potential valvular abnormality? But I see no TTE through [facility]. I would recommend a repeat TTE. Order should be faxed to [facility]  - encourage Ca/vit D supplementation given osteopenia. It has been >2 years since her last DEXA, would be worthwhile to repeat. Have ordered, will fax order to [facility]. Need to calculate FRAX to determine if bisphosphonate warranted. In either case, should stop smoking and incorporate weight-bearing exercise.    - for FMS - trying to incorporate routine exercise regimen, routine stretching such as yves chi and/or yoga, improving sleep quality. May benefit from muscle relaxer at night such as flexeril, but would ask this be initiated/managed by her PCP. She is already on doxepin with some improvement.     Health maintenance  HBV: neg   HCV: neg  HIV:  neg  TB: needs to be checked  Vitamin D and calcium/bone health: Vit D normal 1/2017. Recheck  today  Cancer screenings (skin, breast, colon, pap): mammogram, colonoscopy UTD  Immunizations: would encourage shingrix vaccine through PCP (or here at her next visit)    RTC in 4-6 mo with Dr. Villeda (or new fellow)    Patient was seen and discussed with Dr. Morin.     Bailey Root MD  Rheumatology Fellow  Pager [phone]          Active Problem List:     Patient Active Problem List    Diagnosis Date Noted     Sjogren's syndrome (H) 02/28/2018     Priority: Medium            History of Present Illness:     Chief Complaint   Patient presents with     RECHECK     4 month  follow up lupus     Initial rheumatological visit 2/2018  The patient is a 52 year old female who presents today as a self-referral for a history of SLE. She reports a long-standing history of fatigue, myalgias, arthralgias, weakness, paresthesias, sicca symptoms, Raynaud's (since her 30's), alopecia, and various rashes. She was found to have a positive HAN 1:160 (homogeneous pattern) and MPO (but negative ANCA) and was referred to rheumatology in 2/2017. She was initially evaluated by Dr. Hartman of [facility] and diagnosed with fibromyalgia, chronic pain syndrome, and osteoarthritis. Additional lab testing revealed positive lupus anticoagulant and low titer + aCL IgM, with negative dsDNA, Feliz, RF/CCP, ANCA, and normal C3/C4. She was treated with zofran as needed for nausea, doxepin at night for sleep, and celexa and tramadol for FMS symptoms.     The patient reports for many years she has had various symptoms that have largely  Been unexplained. She reports developing Raynaud's of her hands/feet in her 30's, describing color change to white then red with cold exposure, but also notes atypical symptoms of this happening in the summer as well. She denies having ulcers of the tips of fingers, but does think her fingers/toes have cracking skin. She also notes a long history of joint pain including most joints involving the  "feet, knees, hips (points anterior groin), hands, wrists, and shoulders. Symptoms seem to be more severe on L side. She denies swelling of the joints, but does note her hands will feel puffy and it is hard to get her rings on/off. She does have some morning stiffness lasting about an hour. She also notes both muscle pain and weakness \"all over,\" but specifically points out decreased  strength. She has noticed she's dropping objects. She does recall having EMG of her arms but thinks this was normal. She has chronic back pain and sciatica of her left leg.     She also notes various rashes that are heat sensitive (not actually photosensitive or just in areas of sun exposure), with a blistering rash on her chest, back, arms, and shoulders. She has never seen dermatology for this and not had this biopsied. She notes numbness/tingling \"all over\" that is lightning like, lasting only seconds and occurs intermittently without obvious trigger. She has ongoing sicca symptoms for many years. Follows with ophthalmology and uses AT as needed. Has never been on restasis or xiidra. She does think she had Schirmer's testing done at some point, but she is unclear. She does not wear contacts. Regarding her dry mouth, she drinks water frequently throughout the day. She does not use biotene products and has never been on evoxac or salagen. She did have to have many teeth pulled in the last 10 years due to poor dentition. She does have GERD symptoms mostly at night. Does describe oropharyngeal dysphagia. Uses ranitidine as needed which does help. Describes brain fog symptoms.     Has chronic abdominal bloating and constipation. Some diarrhea. Eats one meal per day but gaining weight. Has had a colonoscopy several years ago that she reports was normal. Some chronic sinus problems, frequent chest colds, epistaxis. Does report oral ulcers, tiny bumps along the upper front gums that she was told may have been thrush (but didn't improve " "with nystatin).     Describes intermittent \"dizzy\" episodes which she describes more as lightheadedness, SOB, and palpitations. These can occur at rest, not just with activity. She has not had this worked up by cardiology or her primary. Is in between primary care providers currently as recent one retired. Reports hx of tricuspid valvular disease, although she can't recall when her last TTE was.     Describes \"flares\" of many of the above symptoms, primarily arthralgias, myalgias, weakness that will last for several weeks then resolve on own (has never been on prednisone), occurring about 5x per year. She has never been on plaquenil. Has tried tylenol and ibuprofen with some help for muscle/joint pain. Naproxen didn't help.     Doesn't sleep well at night. Will wake up and not be able to fall back asleep due to pain. Doxepin does help her sleep some. Does not have routine exercise regimen.     Hx of 1 late 1st trimester/early 2nd trimester miscarriage that required a D&C. No hx of blood clots. No fam hx of recurrent miscarriage.     Interim hx 5/21/2018:  She is doing well today. Has been on plaquenil for several months now and does think it has helped her joint pain and flu like symptoms. Has felt some viral symptoms for past 1.5 weeks with flu like symptoms, feeling run down, fatigued, increased arthralgias and myalgias. She does not her plaquenil was changed from one brand to another and she wonders if her symptoms could be due to that. Does have oral ulcer today on her left cheek and upper gumline. Continues to have sicca symptoms. Does think salagen has helped her dry mouth. Hasn't seen dentist. Feels as though she has gritty sensation in eye. Using AT. Seeing ophthalmology soon. Some diffuse joint symptoms still but no joint swelling. Raynaud's mild and only when cold, not when reaches into freezer/fridge. No digital ulcerations. Occasional SOB. No pleuritis. No cough. Still taking doxepin at night which helps " her sleep some.            Review of Systems:   Complete ROS negative except for symptoms mentioned in the HPI            Past Medical History:   L shoulder adhesive capsulitis  Fibromyalgia  Chronic pain syndrome  Raynaud's phenomenon since age 30's          Social History:   Has 4 children - 2 younger that live in house and 2 out of house.    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  Occasional EtOH use  Current smoker - 2 packs/week         Family History:     GF - CVA, recurrent thrombosis  MGM - t1DM (diagnosed in 30's)  PGM - RA         Allergies:     Allergies   Allergen Reactions     Cefaclor Hives            Medications:     Current Outpatient Prescriptions   Medication Sig Dispense Refill     doxepin (SINEQUAN) 10 MG/ML (HIGH CONC) solution 10 mg       hydroxychloroquine (PLAQUENIL) 200 MG tablet Take 1 tablet (200 mg) by mouth 2 times daily 180 tablet 3     pilocarpine (SALAGEN) 5 MG tablet Take 1 tablet up to 3x daily for dry mouth 90 tablet 1     pyridoxine (VITAMIN B-6) 50 MG TABS 50 mg       RaNITidine HCl (ZANTAC PO) Take by mouth 2 times daily              Physical Exam:   Blood pressure (!) 135/92, pulse 57, temperature 98.5  F (36.9  C), temperature source Oral, weight 68.1 kg (150 lb 1.6 oz), SpO2 97 %.  Wt Readings from Last 4 Encounters:   05/21/18 68.1 kg (150 lb 1.6 oz)   02/26/18 68.2 kg (150 lb 6.4 oz)     BP Readings from Last 3 Encounters:   05/21/18 (!) 135/92   02/26/18 138/79       Constitutional: well-developed, appearing stated age  Eyes: PERRLA, normal conjunctiva, sclera  ENT: nl external ears, nose, lips. No mucous membrane lesions, slightly decreased salivary pool. Wearing partials on upper teeth.   Neck: no cervical or supraclavicular lymphadenopathy, no parotid swelling, normal palpable thyroid  Resp: CTAB, no wheezing, breathing comfortably on room air  CV: RRR, no  m/r/g, no LE edema  GI: soft, NT/ND, +BS, no HSM  : not tested  Lymph: no cervical, supraclavicular or epitrochlear nodes  MS: All joints including shoulder, elbow, wrist, MCP/PIP/DIP, hip, knee, ankle, and foot MTP/PIP/DIP joints examined and found normal with full ROM and w/o synovitis or deformity. Previously with 16/18 FMS tender spots on exam.   No dactylitis,  tenosynovitis, enthesopathy.  Skin: no nail pitting; no nodules, overall tanned appearance of skin. Left upper lateral thigh with regan 5 cm red scaly plaque with raised red serpigenous border.   Psych: nl judgement, orientation, memory, affect.  Neuro: CN II-XII grossly intact, moving all extremities equally, normal gait. Strength grossly normal.          Data:     Results for orders placed or performed in visit on 02/26/18   Complement C3   Result Value Ref Range    Complement C3 111 76 - 169 mg/dL   Complement C4   Result Value Ref Range    Complement C4 20 15 - 50 mg/dL   DNA double stranded antibodies   Result Value Ref Range    DNA-ds 1 <10 IU/mL   ALBINO Panel (RNP, SMITH, Scleroderma, SSAB, SSBB); ALBINO, Antibodies, Panel   Result Value Ref Range    RNP Antibody IgG <0.2 0.0 - 0.9 AI    Feliz ALBINO Antibody IgG <0.2 0.0 - 0.9 AI    SSA (Ro) (ALBINO) Antibody, IgG 1.8 (H) 0.0 - 0.9 AI    SSB (La) (ALBINO) Antibody, IgG 0.4 0.0 - 0.9 AI    Scleroderma Antibody Scl-70 ALBINO IgG 0.2 0.0 - 0.9 AI   Rheumatoid factor   Result Value Ref Range    Rheumatoid Factor <20 <20 IU/mL   Routine UA with microscopic   Result Value Ref Range    Color Urine Yellow     Appearance Urine Clear     Glucose Urine Negative NEG^Negative mg/dL    Bilirubin Urine Negative NEG^Negative    Ketones Urine Negative NEG^Negative mg/dL    Specific Gravity Urine 1.014 1.003 - 1.035    Blood Urine Negative NEG^Negative    pH Urine 7.0 5.0 - 7.0 pH    Protein Albumin Urine Negative NEG^Negative mg/dL    Urobilinogen mg/dL 0.0 0.0 - 2.0 mg/dL    Nitrite Urine Negative NEG^Negative    Leukocyte  Esterase Urine Negative NEG^Negative    Source Midstream Urine     WBC Urine <1 0 - 2 /HPF    RBC Urine <1 0 - 2 /HPF    Squamous Epithelial /HPF Urine <1 0 - 1 /HPF    Mucous Urine Present (A) NEG^Negative /LPF   Basic metabolic panel   Result Value Ref Range    Sodium 140 133 - 144 mmol/L    Potassium 3.8 3.4 - 5.3 mmol/L    Chloride 105 94 - 109 mmol/L    Carbon Dioxide 30 20 - 32 mmol/L    Anion Gap 5 3 - 14 mmol/L    Glucose 73 70 - 99 mg/dL    Urea Nitrogen 10 7 - 30 mg/dL    Creatinine 0.77 0.52 - 1.04 mg/dL    GFR Estimate 78 >60 mL/min/1.7m2    GFR Estimate If Black >90 >60 mL/min/1.7m2    Calcium 8.9 8.5 - 10.1 mg/dL   CBC with platelets differential   Result Value Ref Range    WBC 6.3 4.0 - 11.0 10e9/L    RBC Count 4.30 3.8 - 5.2 10e12/L    Hemoglobin 13.5 11.7 - 15.7 g/dL    Hematocrit 41.7 35.0 - 47.0 %    MCV 97 78 - 100 fl    MCH 31.4 26.5 - 33.0 pg    MCHC 32.4 31.5 - 36.5 g/dL    RDW 12.7 10.0 - 15.0 %    Platelet Count 162 150 - 450 10e9/L    Diff Method Automated Method     % Neutrophils 46.2 %    % Lymphocytes 35.4 %    % Monocytes 11.6 %    % Eosinophils 5.6 %    % Basophils 1.0 %    % Immature Granulocytes 0.2 %    Nucleated RBCs 0 0 /100    Absolute Neutrophil 2.9 1.6 - 8.3 10e9/L    Absolute Lymphocytes 2.2 0.8 - 5.3 10e9/L    Absolute Monocytes 0.7 0.0 - 1.3 10e9/L    Absolute Eosinophils 0.4 0.0 - 0.7 10e9/L    Absolute Basophils 0.1 0.0 - 0.2 10e9/L    Abs Immature Granulocytes 0.0 0 - 0.4 10e9/L    Absolute Nucleated RBC 0.0    Hepatic panel   Result Value Ref Range    Bilirubin Direct <0.1 0.0 - 0.2 mg/dL    Bilirubin Total 0.2 0.2 - 1.3 mg/dL    Albumin 3.8 3.4 - 5.0 g/dL    Protein Total 7.8 6.8 - 8.8 g/dL    Alkaline Phosphatase 128 40 - 150 U/L    ALT 19 0 - 50 U/L    AST 17 0 - 45 U/L   Hepatitis B core antibody   Result Value Ref Range    Hepatitis B Core Paige Nonreactive NR^Nonreactive   Hepatitis B surface antigen   Result Value Ref Range    Hep B Surface Agn Nonreactive  NR^Nonreactive   Hepatitis C antibody   Result Value Ref Range    Hepatitis C Antibody Nonreactive NR^Nonreactive   CK total   Result Value Ref Range    CK Total 87 30 - 225 U/L   IgM   Result Value Ref Range    IGM 92 60 - 265 mg/dL   IgA   Result Value Ref Range     70 - 380 mg/dL   Cryoglobulin quantitative   Result Value Ref Range    Cryoglobulin Trace (A) NEG^Negative %   Centromere Antibody IgG   Result Value Ref Range    Centromere Antibody IgG <0.2 0.0 - 0.9 AI   Immunoglobulin G subclasses   Result Value Ref Range    IGG 1090 695 - 1620 mg/dL    IgG1 454 300 - 856 mg/dL    IgG2 393 158 - 761 mg/dL    IgG3 107 24 - 192 mg/dL    IgG4 57 11 - 86 mg/dL   TSH with free T4 reflex   Result Value Ref Range    TSH 1.03 0.40 - 4.00 mU/L   Lupus Anticoagulant Panel   Result Value Ref Range    Lupus Result Positive (A) NEG^Negative   Beta 2 Glycoprotein 1 Antibody IgG   Result Value Ref Range    Beta 2 Glycoprotein 1 Antibody IgG <0.6 <7 U/mL   Beta 2 Glycoprotein 1 Antibody IgM   Result Value Ref Range    Beta 2 Glycoprotein 1 Antibody IgM 8.2 (H) <7 U/mL   Cardiolipin Paige IgG and IgM   Result Value Ref Range    Cardiolipin Antibody IgG <1.6 0.0 - 19.9 GPL-U/mL    Cardiolipin Antibody IgM 8.5 0.0 - 19.9 MPL-U/mL   HIV Antigen Antibody Combo   Result Value Ref Range    HIV Antigen Antibody Combo Nonreactive NR^Nonreactive       Antineutrophil cytoplasmic Paige IgG   Result Value Ref Range    Neutrophil Cytoplasmic IgG Antibody <1:20 <1:20   Deamidated Gliadin Peptide Paige IgA IgG   Result Value Ref Range    Deamidated Gliadin Paige, IgA 1 <7 U/mL    Deamidated Gliadin Paige, IgG <1 <7 U/mL   Endomysial Antibody IgA by IFA   Result Value Ref Range    Endomysial Antibody IgA by IFA <1:10 <1:10   Tissue transglutaminase antibody IgA   Result Value Ref Range    Tissue Transglutaminase Antibody IgA <1 <7 U/mL   Vitamin D Deficiency   Result Value Ref Range    Vitamin D Deficiency screening 32 20 - 75 ug/L   Direct  "antiglobulin test (DATG)   Result Value Ref Range    MELODY  Broad Spectrum Neg        Bailey Root MD    Lab Results   Component Value Date    WBC 6.3 02/26/2018     Lab Results   Component Value Date    RBC 4.30 02/26/2018     Lab Results   Component Value Date    HGB 13.5 02/26/2018     Lab Results   Component Value Date    HCT 41.7 02/26/2018     No components found for: MCT  Lab Results   Component Value Date    MCV 97 02/26/2018     Lab Results   Component Value Date    MCH 31.4 02/26/2018     Lab Results   Component Value Date    MCHC 32.4 02/26/2018     Lab Results   Component Value Date    RDW 12.7 02/26/2018     Lab Results   Component Value Date     02/26/2018       Lab Results   Component Value Date    AST 17 02/26/2018     Lab Results   Component Value Date    ALT 19 02/26/2018     No results found for: BILICONJ   Lab Results   Component Value Date    BILITOTAL 0.2 02/26/2018     Lab Results   Component Value Date    ALBUMIN 3.8 02/26/2018     Lab Results   Component Value Date    PROTTOTAL 7.8 02/26/2018      Lab Results   Component Value Date    ALKPHOS 128 02/26/2018       Last Basic Metabolic Panel:  Lab Results   Component Value Date     02/26/2018      Lab Results   Component Value Date    POTASSIUM 3.8 02/26/2018     Lab Results   Component Value Date    CHLORIDE 105 02/26/2018     Lab Results   Component Value Date    DAVY 8.9 02/26/2018     Lab Results   Component Value Date    CO2 30 02/26/2018     Lab Results   Component Value Date    BUN 10 02/26/2018     Lab Results   Component Value Date    CR 0.77 02/26/2018     Lab Results   Component Value Date    GLC 73 02/26/2018     TTE reportedly last in 2008, there was partial report pasted in prior rheumatology note:  \"Echo in 2008, obtained for history of tricuspid valve disease, showed an EF of 88%, normal LV, normal aorta, mild-to-moderate leaflet thickening of the mitral valve with trivial regurgitation, and small pericardial " "effusion, circumferential to the heart. Repeat evaluation was recommended in 3 months, and this was not done.\"    Labs:  Positive HAN x2, 1:80 and 1:160 (homogeneous)  Positive SSA  Low titer +aCL IgM 38 -> repeat negative   Positive LAC -> repeat positive   Negative Sm, dsDNA, aCL IgG, B2GP IgG/IgM -> B2GP IgM now low level positive  Normal CK, TSH, vitamin D   Negative ANCA  Normal C3, C4 -> repeat normal   OK Cr, CBC    MRI L shoulder: findings concerning for possible adhesive capsulitis     Reviewed all relevant data including labs and imaging.     I saw the patient with the fellow.  My exam and recomendations are as described.      Sterling Morin MD    "

## 2018-06-04 PROBLEM — Z79.899 HIGH RISK MEDICATION USE: Status: ACTIVE | Noted: 2018-06-04

## 2018-06-04 PROBLEM — M32.19 OTHER SYSTEMIC LUPUS ERYTHEMATOSUS WITH OTHER ORGAN INVOLVEMENT (H): Status: ACTIVE | Noted: 2018-06-04

## 2018-06-04 PROBLEM — K12.0 APHTHOUS ULCER: Status: ACTIVE | Noted: 2018-06-04

## 2019-02-20 ENCOUNTER — DOCUMENTATION ONLY (OUTPATIENT)
Dept: CARE COORDINATION | Facility: CLINIC | Age: 54
End: 2019-02-20

## 2019-03-20 ENCOUNTER — OFFICE VISIT (OUTPATIENT)
Dept: RHEUMATOLOGY | Facility: CLINIC | Age: 54
End: 2019-03-20
Attending: INTERNAL MEDICINE
Payer: COMMERCIAL

## 2019-03-20 VITALS
DIASTOLIC BLOOD PRESSURE: 85 MMHG | HEART RATE: 70 BPM | TEMPERATURE: 97.7 F | OXYGEN SATURATION: 97 % | BODY MASS INDEX: 26.33 KG/M2 | WEIGHT: 155.8 LBS | SYSTOLIC BLOOD PRESSURE: 136 MMHG

## 2019-03-20 DIAGNOSIS — M79.7 FIBROMYALGIA: ICD-10-CM

## 2019-03-20 DIAGNOSIS — M32.9 SYSTEMIC LUPUS ERYTHEMATOSUS, UNSPECIFIED SLE TYPE, UNSPECIFIED ORGAN INVOLVEMENT STATUS (H): Primary | ICD-10-CM

## 2019-03-20 DIAGNOSIS — M32.9 SYSTEMIC LUPUS ERYTHEMATOSUS, UNSPECIFIED SLE TYPE, UNSPECIFIED ORGAN INVOLVEMENT STATUS (H): ICD-10-CM

## 2019-03-20 LAB
ALBUMIN SERPL-MCNC: 3.9 G/DL (ref 3.4–5)
ALBUMIN UR-MCNC: NEGATIVE MG/DL
ALT SERPL W P-5'-P-CCNC: 22 U/L (ref 0–50)
APPEARANCE UR: CLEAR
AST SERPL W P-5'-P-CCNC: 20 U/L (ref 0–45)
BACTERIA #/AREA URNS HPF: ABNORMAL /HPF
BASOPHILS # BLD AUTO: 0.1 10E9/L (ref 0–0.2)
BASOPHILS NFR BLD AUTO: 0.9 %
BILIRUB UR QL STRIP: NEGATIVE
COLOR UR AUTO: YELLOW
CREAT SERPL-MCNC: 0.86 MG/DL (ref 0.52–1.04)
CREAT UR-MCNC: 146 MG/DL
CREAT UR-MCNC: 146 MG/DL
CRP SERPL-MCNC: <2.9 MG/L (ref 0–8)
DIFFERENTIAL METHOD BLD: NORMAL
EOSINOPHIL # BLD AUTO: 0.3 10E9/L (ref 0–0.7)
EOSINOPHIL NFR BLD AUTO: 5.2 %
ERYTHROCYTE [DISTWIDTH] IN BLOOD BY AUTOMATED COUNT: 12.1 % (ref 10–15)
ERYTHROCYTE [SEDIMENTATION RATE] IN BLOOD BY WESTERGREN METHOD: 9 MM/H (ref 0–30)
GFR SERPL CREATININE-BSD FRML MDRD: 77 ML/MIN/{1.73_M2}
GLUCOSE UR STRIP-MCNC: NEGATIVE MG/DL
HCT VFR BLD AUTO: 42.1 % (ref 35–47)
HGB BLD-MCNC: 13.6 G/DL (ref 11.7–15.7)
HGB UR QL STRIP: NEGATIVE
IMM GRANULOCYTES # BLD: 0 10E9/L (ref 0–0.4)
IMM GRANULOCYTES NFR BLD: 0.2 %
KETONES UR STRIP-MCNC: NEGATIVE MG/DL
LEUKOCYTE ESTERASE UR QL STRIP: NEGATIVE
LYMPHOCYTES # BLD AUTO: 2.8 10E9/L (ref 0.8–5.3)
LYMPHOCYTES NFR BLD AUTO: 42.8 %
MCH RBC QN AUTO: 31.5 PG (ref 26.5–33)
MCHC RBC AUTO-ENTMCNC: 32.3 G/DL (ref 31.5–36.5)
MCV RBC AUTO: 98 FL (ref 78–100)
MONOCYTES # BLD AUTO: 0.6 10E9/L (ref 0–1.3)
MONOCYTES NFR BLD AUTO: 9.6 %
MUCOUS THREADS #/AREA URNS LPF: PRESENT /LPF
NEUTROPHILS # BLD AUTO: 2.7 10E9/L (ref 1.6–8.3)
NEUTROPHILS NFR BLD AUTO: 41.3 %
NITRATE UR QL: NEGATIVE
NRBC # BLD AUTO: 0 10*3/UL
NRBC BLD AUTO-RTO: 0 /100
PH UR STRIP: 5 PH (ref 5–7)
PLATELET # BLD AUTO: 180 10E9/L (ref 150–450)
PROT UR-MCNC: 0.28 G/L
PROT/CREAT 24H UR: 0.19 G/G CR (ref 0–0.2)
RBC # BLD AUTO: 4.32 10E12/L (ref 3.8–5.2)
RBC #/AREA URNS AUTO: <1 /HPF (ref 0–2)
SOURCE: ABNORMAL
SP GR UR STRIP: 1.02 (ref 1–1.03)
SQUAMOUS #/AREA URNS AUTO: <1 /HPF (ref 0–1)
UROBILINOGEN UR STRIP-MCNC: 0 MG/DL (ref 0–2)
WBC # BLD AUTO: 6.5 10E9/L (ref 4–11)
WBC #/AREA URNS AUTO: 1 /HPF (ref 0–5)

## 2019-03-20 PROCEDURE — 84450 TRANSFERASE (AST) (SGOT): CPT | Performed by: INTERNAL MEDICINE

## 2019-03-20 PROCEDURE — 82040 ASSAY OF SERUM ALBUMIN: CPT | Performed by: INTERNAL MEDICINE

## 2019-03-20 PROCEDURE — 36415 COLL VENOUS BLD VENIPUNCTURE: CPT | Performed by: INTERNAL MEDICINE

## 2019-03-20 PROCEDURE — 86147 CARDIOLIPIN ANTIBODY EA IG: CPT | Performed by: INTERNAL MEDICINE

## 2019-03-20 PROCEDURE — 86146 BETA-2 GLYCOPROTEIN ANTIBODY: CPT | Performed by: INTERNAL MEDICINE

## 2019-03-20 PROCEDURE — 00000401 ZZHCL STATISTIC THROMBIN TIME NC: Performed by: INTERNAL MEDICINE

## 2019-03-20 PROCEDURE — 86160 COMPLEMENT ANTIGEN: CPT | Performed by: INTERNAL MEDICINE

## 2019-03-20 PROCEDURE — 85730 THROMBOPLASTIN TIME PARTIAL: CPT | Performed by: INTERNAL MEDICINE

## 2019-03-20 PROCEDURE — 85613 RUSSELL VIPER VENOM DILUTED: CPT | Performed by: INTERNAL MEDICINE

## 2019-03-20 PROCEDURE — 85025 COMPLETE CBC W/AUTO DIFF WBC: CPT | Performed by: INTERNAL MEDICINE

## 2019-03-20 PROCEDURE — 86225 DNA ANTIBODY NATIVE: CPT | Performed by: INTERNAL MEDICINE

## 2019-03-20 PROCEDURE — 82306 VITAMIN D 25 HYDROXY: CPT | Performed by: INTERNAL MEDICINE

## 2019-03-20 PROCEDURE — 84460 ALANINE AMINO (ALT) (SGPT): CPT | Performed by: INTERNAL MEDICINE

## 2019-03-20 PROCEDURE — 81001 URINALYSIS AUTO W/SCOPE: CPT | Performed by: INTERNAL MEDICINE

## 2019-03-20 PROCEDURE — 85597 PHOSPHOLIPID PLTLT NEUTRALIZ: CPT | Performed by: INTERNAL MEDICINE

## 2019-03-20 PROCEDURE — 85652 RBC SED RATE AUTOMATED: CPT | Performed by: INTERNAL MEDICINE

## 2019-03-20 PROCEDURE — 00000167 ZZHCL STATISTIC INR NC: Performed by: INTERNAL MEDICINE

## 2019-03-20 PROCEDURE — 84156 ASSAY OF PROTEIN URINE: CPT | Performed by: INTERNAL MEDICINE

## 2019-03-20 PROCEDURE — 86140 C-REACTIVE PROTEIN: CPT | Performed by: INTERNAL MEDICINE

## 2019-03-20 PROCEDURE — G0463 HOSPITAL OUTPT CLINIC VISIT: HCPCS | Mod: ZF

## 2019-03-20 PROCEDURE — 82565 ASSAY OF CREATININE: CPT | Performed by: INTERNAL MEDICINE

## 2019-03-20 RX ORDER — GABAPENTIN 300 MG/1
CAPSULE ORAL
Qty: 90 CAPSULE | Refills: 3 | Status: SHIPPED | OUTPATIENT
Start: 2019-03-20 | End: 2019-04-26

## 2019-03-20 ASSESSMENT — PAIN SCALES - GENERAL: PAINLEVEL: MODERATE PAIN (5)

## 2019-03-20 NOTE — PROGRESS NOTES
[facility] - Rheumatology Clinic Visit     Danika Light MRN# [id number]   YOB: 1965    Primary care provider: Dr. 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 No hx DVT/PE or recurrent blood clot or miscarriages in family. Possible hx of first trimester miscarriages in pt but these were unconfirmed. There are no guidelines regarding prophylactic/preventative anticoagulation in pts with APS based on pregnancy morbidity. Could consider ASA 81 mg qd addition.   # Fibromyalgia   # scaly plaques on left hip   # osteopenia - T score - 1.6 of L femur 1/2016  # hx of ?tricuspid valvular abnormality, do not see prior TTE  # hx of L shoulder adhesive capsulitis, significantly improved with PT  # vitamin D deficiency     Patient is a 53 year old female with likely SLE (vs Sjogren's syndrome) with primarily mucocutaneous and joint manifestations, moderately improved after 2 months of HCQ use. Pt does meet SLICC criteria for SLE with +HAN, SSA, [id number], and LAC along with oral ulcers, non-scarring alopecia, and arthralgias. She has no evidence of severe organ damage, with recently normal blood counts, liver and kidney tests. I encouraged her to continue HCQ use for potential prevention of disease progression and for treatment of her fatigue and joint symptoms. I did encourage her to follow up with ophthalmology for dry eyes as she may benefit from addition of restasis or xiidra in addition to her preservative free artificial tears. I also encouraged her to follow up with dentist given sicca-associated dental disease. I see no need to further  increase immunosuppression at this time. If joint symptoms continue to be large issue and concern they are progressing (and not part of her FMS), could consider addition of AZA vs MTX.     Recs:  - continue  mg qd  - follow up with ophthalmology. Needs at annual OCT screenings. Ask ophthalmology about restasis or xiidra  - continue biotene products as needed  - follow up with dentist   - continue salagen 5 mg up to 3-4x per day PRN for dry mouth   - follow up with dermatology regarding left hip rash. Looks to me like tinea corporis, less like a CTD-associated rash. If dermatology is concerned to CTD rash would encourage biopsy   - consider formal neuropsych testing for brain fog   - trial kenalog paste for oral ulcers  - disease monitoring labs at her next visit - CBC w/ diff, LFT, Cr, urinalysis, complements, dsDNA, ESR/CRP  - discussed importance of avoiding sun exposure given her autoimmunity could be triggered by sun  - encourage smoking cessation, as this can continue to trigger autoimmunity   - there was previous mention of a potential valvular abnormality? But I see no TTE through [facility]. I would recommend a repeat TTE. Order should be faxed to [facility]  - encourage Ca/vit D supplementation given osteopenia. It has been >2 years since her last DEXA, would be worthwhile to repeat. Have ordered, will fax order to [facility]. Need to calculate FRAX to determine if bisphosphonate warranted. In either case, should stop smoking and incorporate weight-bearing exercise.    - for FMS - trying to incorporate routine exercise regimen, routine stretching such as yves chi and/or yoga, improving sleep quality. May benefit from muscle relaxer at night such as flexeril, but would ask this be initiated/managed by her PCP. She is already on doxepin with some improvement.     Health maintenance  HBV: neg   HCV: neg  HIV:  neg  TB: needs to be checked  Vitamin D and calcium/bone health: Vit D normal 1/2017. Recheck  today  Cancer screenings (skin, breast, colon, pap): mammogram, colonoscopy UTD  Immunizations: would encourage shingrix vaccine through PCP (or here at her next visit)    RTC in 4-6 mo with Dr. Villeda (or new fellow)    Labs today    Try the gabapentin for fibromyalgia pain    Recommend Lyndon Chi    Recommend shingrix vaccine, you could get it by PCP    Return in 4 months          Active Problem List:     Patient Active Problem List    Diagnosis Date Noted     Other systemic lupus erythematosus with other organ involvement (H) 06/04/2018     Priority: Medium     High risk medication use 06/04/2018     Priority: Medium     Aphthous ulcer 06/04/2018     Priority: Medium     Sjogren's syndrome (H) 02/28/2018     Priority: Medium            History of Present Illness:     Chief Complaint   Patient presents with     RECHECK     lupus     Initial rheumatological visit 2/2018  The patient is a 52 year old female who presents today as a self-referral for a history of SLE. She reports a long-standing history of fatigue, myalgias, arthralgias, weakness, paresthesias, sicca symptoms, Raynaud's (since her 30's), alopecia, and various rashes. She was found to have a positive HAN 1:160 (homogeneous pattern) and MPO (but negative ANCA) and was referred to rheumatology in 2/2017. She was initially evaluated by Dr. Hartman of [facility] and diagnosed with fibromyalgia, chronic pain syndrome, and osteoarthritis. Additional lab testing revealed positive lupus anticoagulant and low titer + aCL IgM, with negative dsDNA, Feliz, RF/CCP, ANCA, and normal C3/C4. She was treated with zofran as needed for nausea, doxepin at night for sleep, and celexa and tramadol for FMS symptoms.     The patient reports for many years she has had various symptoms that have largely  Been unexplained. She reports developing Raynaud's of her hands/feet in her 30's, describing color change to white then red with cold exposure, but also notes atypical  "symptoms of this happening in the summer as well. She denies having ulcers of the tips of fingers, but does think her fingers/toes have cracking skin. She also notes a long history of joint pain including most joints involving the feet, knees, hips (points anterior groin), hands, wrists, and shoulders. Symptoms seem to be more severe on L side. She denies swelling of the joints, but does note her hands will feel puffy and it is hard to get her rings on/off. She does have some morning stiffness lasting about an hour. She also notes both muscle pain and weakness \"all over,\" but specifically points out decreased  strength. She has noticed she's dropping objects. She does recall having EMG of her arms but thinks this was normal. She has chronic back pain and sciatica of her left leg.     She also notes various rashes that are heat sensitive (not actually photosensitive or just in areas of sun exposure), with a blistering rash on her chest, back, arms, and shoulders. She has never seen dermatology for this and not had this biopsied. She notes numbness/tingling \"all over\" that is lightning like, lasting only seconds and occurs intermittently without obvious trigger. She has ongoing sicca symptoms for many years. Follows with ophthalmology and uses AT as needed. Has never been on restasis or xiidra. She does think she had Schirmer's testing done at some point, but she is unclear. She does not wear contacts. Regarding her dry mouth, she drinks water frequently throughout the day. She does not use biotene products and has never been on evoxac or salagen. She did have to have many teeth pulled in the last 10 years due to poor dentition. She does have GERD symptoms mostly at night. Does describe oropharyngeal dysphagia. Uses ranitidine as needed which does help. Describes brain fog symptoms.     Has chronic abdominal bloating and constipation. Some diarrhea. Eats one meal per day but gaining weight. Has had a colonoscopy " "several years ago that she reports was normal. Some chronic sinus problems, frequent chest colds, epistaxis. Does report oral ulcers, tiny bumps along the upper front gums that she was told may have been thrush (but didn't improve with nystatin).     Describes intermittent \"dizzy\" episodes which she describes more as lightheadedness, SOB, and palpitations. These can occur at rest, not just with activity. She has not had this worked up by cardiology or her primary. Is in between primary care providers currently as recent one retired. Reports hx of tricuspid valvular disease, although she can't recall when her last TTE was.     Describes \"flares\" of many of the above symptoms, primarily arthralgias, myalgias, weakness that will last for several weeks then resolve on own (has never been on prednisone), occurring about 5x per year. She has never been on plaquenil. Has tried tylenol and ibuprofen with some help for muscle/joint pain. Naproxen didn't help.     Doesn't sleep well at night. Will wake up and not be able to fall back asleep due to pain. Doxepin does help her sleep some. Does not have routine exercise regimen.     Hx of 1 late 1st trimester/early 2nd trimester miscarriage that required a D&C. No hx of blood clots. No fam hx of recurrent miscarriage.     Interim hx 5/21/2018:  She is doing well today. Has been on plaquenil for several months now and does think it has helped her joint pain and flu like symptoms. Has felt some viral symptoms for past 1.5 weeks with flu like symptoms, feeling run down, fatigued, increased arthralgias and myalgias. She does not her plaquenil was changed from one brand to another and she wonders if her symptoms could be due to that. Does have oral ulcer today on her left cheek and upper gumline. Continues to have sicca symptoms. Does think salagen has helped her dry mouth. Hasn't seen dentist. Feels as though she has gritty sensation in eye. Using AT. Seeing ophthalmology soon. Some " diffuse joint symptoms still but no joint swelling. Raynaud's mild and only when cold, not when reaches into freezer/fridge. No digital ulcerations. Occasional SOB. No pleuritis. No cough. Still taking doxepin at night which helps her sleep some.     Today: Had a bad winter with more joint pain and muscle pain. Get ache around sinuses, reportedly had neg imaging. Gets stuffy nose during winter, causes headaches with light sensitivity.    HCQ has helped with photosensitive rash.    Fingers swell up, from my waist down hurts. Legs and arms hurt and burns when she moves. Can't sleep because of stiffness which is all day long. Has intermittent hair loss patches. No oral ulcers, reportedly had neg biopsy of the oral lesion. Had EGD, colonoscopy last month and was told to have an esophageal ulcer and was told to have repeat EGD in a month and was prescribed pantoprazole, has bad GERD x 9 yr on ranitidine. On pantoprazole for a month, it helped for 3 wk, it started losing benefit a little bit.    Reports chest wall pain with pain below shoulder blades L>R, thinks L shoulder frozen shoulder is coming back.    Pain is also pleuritic.    Her BP is up and was told to monitor. Sometimes it is low.    Salagen caused so much saliva production and nausea and she takes 1 a day prn.    No oral ulcers.    Had eye exam on HCQ past summer.    No longer tolerates the tramadol or any opioids due to nausea.    Can not tolerate cymbalta.         Review of Systems:   Complete ROS negative except for symptoms mentioned in the HPI            Past Medical History:   L shoulder adhesive capsulitis  Fibromyalgia  Chronic pain syndrome  Raynaud's phenomenon since age 30's          Social History:   Has 4 children - 2 younger that live in house and 2 out of house.    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  Occasional EtOH use  Current  smoker - 2 packs/week         Family History:     GF - CVA, recurrent thrombosis  MGM - t1DM (diagnosed in 30's)  PGM - RA         Allergies:     Allergies   Allergen Reactions     Cefaclor Hives            Medications:     Current Outpatient Medications   Medication Sig Dispense Refill     doxepin (SINEQUAN) 10 MG/ML (HIGH CONC) solution 10 mg       hydroxychloroquine (PLAQUENIL) 200 MG tablet Take 1 tablet (200 mg) by mouth 2 times daily 180 tablet 3     pilocarpine (SALAGEN) 5 MG tablet Take 1 tablet up to 3x daily for dry mouth 180 tablet 2     pyridoxine (VITAMIN B-6) 50 MG TABS 50 mg       RaNITidine HCl (ZANTAC PO) Take by mouth 2 times daily       triamcinolone (KENALOG) 0.1 % paste Take by mouth 2 times daily To use on oral ulcers sparingly up to 2x per day until ulcer heals (or up to 14d) 5 g 1            Physical Exam:   Blood pressure 136/85, pulse 70, temperature 97.7  F (36.5  C), temperature source Oral, weight 70.7 kg (155 lb 12.8 oz), SpO2 97 %.  Wt Readings from Last 4 Encounters:   03/20/19 70.7 kg (155 lb 12.8 oz)   05/21/18 68.1 kg (150 lb 1.6 oz)   02/26/18 68.2 kg (150 lb 6.4 oz)     BP Readings from Last 3 Encounters:   03/20/19 136/85   05/21/18 (!) 135/92   02/26/18 138/79       Constitutional: well-developed, appearing stated age  Eyes: PERRLA, normal conjunctiva, sclera  ENT: nl external ears, nose, lips. No mucous membrane lesions, slightly decreased salivary pool. Wearing partials on upper teeth.   Neck: no cervical or supraclavicular lymphadenopathy, no parotid swelling, normal palpable thyroid  Resp: CTAB, no wheezing, breathing comfortably on room air  CV: RRR, no m/r/g, no LE edema  GI: soft, NT/ND, +BS, no HSM  : not tested  Lymph: no cervical, supraclavicular or epitrochlear nodes  MS: All joints including shoulder, elbow, wrist, MCP/PIP/DIP, hip, knee, ankle, and foot MTP/PIP/DIP joints examined and found normal with full ROM and w/o synovitis or deformity. Previously with  16/18 FMS tender spots on exam.   No dactylitis,  tenosynovitis, enthesopathy.  Skin: no nail pitting; no nodules, overall tanned appearance of skin. Left upper lateral thigh with regan 5 cm red scaly plaque with raised red serpigenous border.   Psych: nl judgement, orientation, memory, affect.  Neuro: CN II-XII grossly intact, moving all extremities equally, normal gait. Strength grossly normal.          Data:     Results for orders placed or performed in visit on 02/20/19   MA Screening Digital Bilateral   Result Value Ref Range    MAMMOGRAM      Narrative    [facility]       Saji Villeda MD    Lab Results   Component Value Date    WBC 6.3 02/26/2018     Lab Results   Component Value Date    RBC 4.30 02/26/2018     Lab Results   Component Value Date    HGB 13.5 02/26/2018     Lab Results   Component Value Date    HCT 41.7 02/26/2018     No components found for: MCT  Lab Results   Component Value Date    MCV 97 02/26/2018     Lab Results   Component Value Date    MCH 31.4 02/26/2018     Lab Results   Component Value Date    MCHC 32.4 02/26/2018     Lab Results   Component Value Date    RDW 12.7 02/26/2018     Lab Results   Component Value Date     02/26/2018       Lab Results   Component Value Date    AST 17 02/26/2018     Lab Results   Component Value Date    ALT 19 02/26/2018     No results found for: BILICONJ   Lab Results   Component Value Date    BILITOTAL 0.2 02/26/2018     Lab Results   Component Value Date    ALBUMIN 3.8 02/26/2018     Lab Results   Component Value Date    PROTTOTAL 7.8 02/26/2018      Lab Results   Component Value Date    ALKPHOS 128 02/26/2018       Last Basic Metabolic Panel:  Lab Results   Component Value Date     02/26/2018      Lab Results   Component Value Date    POTASSIUM 3.8 02/26/2018     Lab Results   Component Value Date    CHLORIDE 105 02/26/2018     Lab Results   Component Value Date    DAVY 8.9 02/26/2018     Lab Results   Component Value Date    CO2 30  "02/26/2018     Lab Results   Component Value Date    BUN 10 02/26/2018     Lab Results   Component Value Date    CR 0.77 02/26/2018     Lab Results   Component Value Date    GLC 73 02/26/2018     TTE reportedly last in 2008, there was partial report pasted in prior rheumatology note:  \"Echo in 2008, obtained for history of tricuspid valve disease, showed an EF of 88%, normal LV, normal aorta, mild-to-moderate leaflet thickening of the mitral valve with trivial regurgitation, and small pericardial effusion, circumferential to the heart. Repeat evaluation was recommended in 3 months, and this was not done.\"    Labs:  Positive HAN x2, 1:80 and 1:160 (homogeneous)  Positive SSA  Low titer +aCL IgM 38 -> repeat negative   Positive LAC -> repeat positive   Negative Sm, dsDNA, aCL IgG, B2GP IgG/IgM -> B2GP IgM now low level positive  Normal CK, TSH, vitamin D   Negative ANCA  Normal C3, C4 -> repeat normal   OK Cr, CBC    MRI L shoulder: findings concerning for possible adhesive capsulitis     Reviewed all relevant data including labs and imaging.       "

## 2019-03-20 NOTE — PATIENT INSTRUCTIONS
Labs today    Try the gabapentin for fibromyalgia pain    Recommend Lyndon Chi    Recommend shingrix vaccine, you could get it by PCP    Return in 4 months

## 2019-03-20 NOTE — LETTER
3/20/2019      RE: Danika Light  [id number] [facility] [id number]       [facility] - Rheumatology Clinic Visit     Danika Light MRN# [id number]   YOB: 1965    Primary care provider: Dr. 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 No hx DVT/PE or recurrent blood clot or miscarriages in family. Possible hx of first trimester miscarriages in pt but these were unconfirmed. There are no guidelines regarding prophylactic/preventative anticoagulation in pts with APS based on pregnancy morbidity. Could consider ASA 81 mg qd addition.   # Fibromyalgia   # scaly plaques on left hip   # osteopenia - T score - 1.6 of L femur 1/2016  # hx of ?tricuspid valvular abnormality, do not see prior TTE  # hx of L shoulder adhesive capsulitis, significantly improved with PT  # vitamin D deficiency     Patient is a 53 year old female with likely SLE (vs Sjogren's syndrome) with primarily mucocutaneous and joint manifestations, moderately improved after 2 months of HCQ use. Pt does meet SLICC criteria for SLE with +HAN, SSA, [id number], and LAC along with oral ulcers, non-scarring alopecia, and arthralgias. She has no evidence of severe organ damage, with recently normal blood counts, liver and kidney tests. I encouraged her to continue HCQ use for potential prevention of disease progression and for treatment of her fatigue and joint symptoms. I did encourage her to follow up with ophthalmology for dry eyes as she may benefit from addition of restasis or xiidra in addition to her preservative free artificial tears. I also encouraged her to follow up with  dentist given sicca-associated dental disease. I see no need to further increase immunosuppression at this time. If joint symptoms continue to be large issue and concern they are progressing (and not part of her FMS), could consider addition of AZA vs MTX.     Recs:  - continue  mg qd  - follow up with ophthalmology. Needs at annual OCT screenings. Ask ophthalmology about restasis or xiidra  - continue biotene products as needed  - follow up with dentist   - continue salagen 5 mg up to 3-4x per day PRN for dry mouth   - follow up with dermatology regarding left hip rash. Looks to me like tinea corporis, less like a CTD-associated rash. If dermatology is concerned to CTD rash would encourage biopsy   - consider formal neuropsych testing for brain fog   - trial kenalog paste for oral ulcers  - disease monitoring labs at her next visit - CBC w/ diff, LFT, Cr, urinalysis, complements, dsDNA, ESR/CRP  - discussed importance of avoiding sun exposure given her autoimmunity could be triggered by sun  - encourage smoking cessation, as this can continue to trigger autoimmunity   - there was previous mention of a potential valvular abnormality? But I see no TTE through [facility]. I would recommend a repeat TTE. Order should be faxed to [facility]  - encourage Ca/vit D supplementation given osteopenia. It has been >2 years since her last DEXA, would be worthwhile to repeat. Have ordered, will fax order to [facility]. Need to calculate FRAX to determine if bisphosphonate warranted. In either case, should stop smoking and incorporate weight-bearing exercise.    - for FMS - trying to incorporate routine exercise regimen, routine stretching such as yves chi and/or yoga, improving sleep quality. May benefit from muscle relaxer at night such as flexeril, but would ask this be initiated/managed by her PCP. She is already on doxepin with some improvement.     Health maintenance  HBV: neg   HCV: neg  HIV:  neg  TB: needs to be  checked  Vitamin D and calcium/bone health: Vit D normal 1/2017. Recheck today  Cancer screenings (skin, breast, colon, pap): mammogram, colonoscopy UTD  Immunizations: would encourage shingrix vaccine through PCP (or here at her next visit)    RTC in 4-6 mo with Dr. Villeda (or new fellow)    Labs today    Try the gabapentin for fibromyalgia pain    Recommend Lyndon Chi    Recommend shingrix vaccine, you could get it by PCP    Return in 4 months          Active Problem List:     Patient Active Problem List    Diagnosis Date Noted     Other systemic lupus erythematosus with other organ involvement (H) 06/04/2018     Priority: Medium     High risk medication use 06/04/2018     Priority: Medium     Aphthous ulcer 06/04/2018     Priority: Medium     Sjogren's syndrome (H) 02/28/2018     Priority: Medium            History of Present Illness:     Chief Complaint   Patient presents with     RECHECK     lupus     Initial rheumatological visit 2/2018  The patient is a 52 year old female who presents today as a self-referral for a history of SLE. She reports a long-standing history of fatigue, myalgias, arthralgias, weakness, paresthesias, sicca symptoms, Raynaud's (since her 30's), alopecia, and various rashes. She was found to have a positive HAN 1:160 (homogeneous pattern) and MPO (but negative ANCA) and was referred to rheumatology in 2/2017. She was initially evaluated by Dr. Hartman of [facility] and diagnosed with fibromyalgia, chronic pain syndrome, and osteoarthritis. Additional lab testing revealed positive lupus anticoagulant and low titer + aCL IgM, with negative dsDNA, Feliz, RF/CCP, ANCA, and normal C3/C4. She was treated with zofran as needed for nausea, doxepin at night for sleep, and celexa and tramadol for FMS symptoms.     The patient reports for many years she has had various symptoms that have largely  Been unexplained. She reports developing Raynaud's of her hands/feet in her 30's, describing  "color change to white then red with cold exposure, but also notes atypical symptoms of this happening in the summer as well. She denies having ulcers of the tips of fingers, but does think her fingers/toes have cracking skin. She also notes a long history of joint pain including most joints involving the feet, knees, hips (points anterior groin), hands, wrists, and shoulders. Symptoms seem to be more severe on L side. She denies swelling of the joints, but does note her hands will feel puffy and it is hard to get her rings on/off. She does have some morning stiffness lasting about an hour. She also notes both muscle pain and weakness \"all over,\" but specifically points out decreased  strength. She has noticed she's dropping objects. She does recall having EMG of her arms but thinks this was normal. She has chronic back pain and sciatica of her left leg.     She also notes various rashes that are heat sensitive (not actually photosensitive or just in areas of sun exposure), with a blistering rash on her chest, back, arms, and shoulders. She has never seen dermatology for this and not had this biopsied. She notes numbness/tingling \"all over\" that is lightning like, lasting only seconds and occurs intermittently without obvious trigger. She has ongoing sicca symptoms for many years. Follows with ophthalmology and uses AT as needed. Has never been on restasis or xiidra. She does think she had Schirmer's testing done at some point, but she is unclear. She does not wear contacts. Regarding her dry mouth, she drinks water frequently throughout the day. She does not use biotene products and has never been on evoxac or salagen. She did have to have many teeth pulled in the last 10 years due to poor dentition. She does have GERD symptoms mostly at night. Does describe oropharyngeal dysphagia. Uses ranitidine as needed which does help. Describes brain fog symptoms.     Has chronic abdominal bloating and constipation. Some " "diarrhea. Eats one meal per day but gaining weight. Has had a colonoscopy several years ago that she reports was normal. Some chronic sinus problems, frequent chest colds, epistaxis. Does report oral ulcers, tiny bumps along the upper front gums that she was told may have been thrush (but didn't improve with nystatin).     Describes intermittent \"dizzy\" episodes which she describes more as lightheadedness, SOB, and palpitations. These can occur at rest, not just with activity. She has not had this worked up by cardiology or her primary. Is in between primary care providers currently as recent one retired. Reports hx of tricuspid valvular disease, although she can't recall when her last TTE was.     Describes \"flares\" of many of the above symptoms, primarily arthralgias, myalgias, weakness that will last for several weeks then resolve on own (has never been on prednisone), occurring about 5x per year. She has never been on plaquenil. Has tried tylenol and ibuprofen with some help for muscle/joint pain. Naproxen didn't help.     Doesn't sleep well at night. Will wake up and not be able to fall back asleep due to pain. Doxepin does help her sleep some. Does not have routine exercise regimen.     Hx of 1 late 1st trimester/early 2nd trimester miscarriage that required a D&C. No hx of blood clots. No fam hx of recurrent miscarriage.     Interim hx 5/21/2018:  She is doing well today. Has been on plaquenil for several months now and does think it has helped her joint pain and flu like symptoms. Has felt some viral symptoms for past 1.5 weeks with flu like symptoms, feeling run down, fatigued, increased arthralgias and myalgias. She does not her plaquenil was changed from one brand to another and she wonders if her symptoms could be due to that. Does have oral ulcer today on her left cheek and upper gumline. Continues to have sicca symptoms. Does think salagen has helped her dry mouth. Hasn't seen dentist. Feels as though " she has gritty sensation in eye. Using AT. Seeing ophthalmology soon. Some diffuse joint symptoms still but no joint swelling. Raynaud's mild and only when cold, not when reaches into freezer/fridge. No digital ulcerations. Occasional SOB. No pleuritis. No cough. Still taking doxepin at night which helps her sleep some.     Today: Had a bad winter with more joint pain and muscle pain. Get ache around sinuses, reportedly had neg imaging. Gets stuffy nose during winter, causes headaches with light sensitivity.    HCQ has helped with photosensitive rash.    Fingers swell up, from my waist down hurts. Legs and arms hurt and burns when she moves. Can't sleep because of stiffness which is all day long. Has intermittent hair loss patches. No oral ulcers, reportedly had neg biopsy of the oral lesion. Had EGD, colonoscopy last month and was told to have an esophageal ulcer and was told to have repeat EGD in a month and was prescribed pantoprazole, has bad GERD x 9 yr on ranitidine. On pantoprazole for a month, it helped for 3 wk, it started losing benefit a little bit.    Reports chest wall pain with pain below shoulder blades L>R, thinks L shoulder frozen shoulder is coming back.    Pain is also pleuritic.    Her BP is up and was told to monitor. Sometimes it is low.    Salagen caused so much saliva production and nausea and she takes 1 a day prn.    No oral ulcers.    Had eye exam on HCQ past summer.    No longer tolerates the tramadol or any opioids due to nausea.    Can not tolerate cymbalta.         Review of Systems:   Complete ROS negative except for symptoms mentioned in the HPI            Past Medical History:   L shoulder adhesive capsulitis  Fibromyalgia  Chronic pain syndrome  Raynaud's phenomenon since age 30's          Social History:   Has 4 children - 2 younger that live in house and 2 out of house.    -  at [employer] today  Hasn't worked in 8 years due to her symptoms - now working part time at a  c[employer] on feet for several hours at a time  Occasional EtOH use  Current smoker - 2 packs/week         Family History:     GF - CVA, recurrent thrombosis  MGM - t1DM (diagnosed in 30's)  PGM - RA         Allergies:     Allergies   Allergen Reactions     Cefaclor Hives            Medications:     Current Outpatient Medications   Medication Sig Dispense Refill     doxepin (SINEQUAN) 10 MG/ML (HIGH CONC) solution 10 mg       hydroxychloroquine (PLAQUENIL) 200 MG tablet Take 1 tablet (200 mg) by mouth 2 times daily 180 tablet 3     pilocarpine (SALAGEN) 5 MG tablet Take 1 tablet up to 3x daily for dry mouth 180 tablet 2     pyridoxine (VITAMIN B-6) 50 MG TABS 50 mg       RaNITidine HCl (ZANTAC PO) Take by mouth 2 times daily       triamcinolone (KENALOG) 0.1 % paste Take by mouth 2 times daily To use on oral ulcers sparingly up to 2x per day until ulcer heals (or up to 14d) 5 g 1            Physical Exam:   Blood pressure 136/85, pulse 70, temperature 97.7  F (36.5  C), temperature source Oral, weight 70.7 kg (155 lb 12.8 oz), SpO2 97 %.  Wt Readings from Last 4 Encounters:   03/20/19 70.7 kg (155 lb 12.8 oz)   05/21/18 68.1 kg (150 lb 1.6 oz)   02/26/18 68.2 kg (150 lb 6.4 oz)     BP Readings from Last 3 Encounters:   03/20/19 136/85   05/21/18 (!) 135/92   02/26/18 138/79       Constitutional: well-developed, appearing stated age  Eyes: PERRLA, normal conjunctiva, sclera  ENT: nl external ears, nose, lips. No mucous membrane lesions, slightly decreased salivary pool. Wearing partials on upper teeth.   Neck: no cervical or supraclavicular lymphadenopathy, no parotid swelling, normal palpable thyroid  Resp: CTAB, no wheezing, breathing comfortably on room air  CV: RRR, no m/r/g, no LE edema  GI: soft, NT/ND, +BS, no HSM  : not tested  Lymph: no cervical, supraclavicular or epitrochlear nodes  MS: All joints including shoulder, elbow, wrist, MCP/PIP/DIP, hip, knee, ankle, and foot MTP/PIP/DIP joints examined and  found normal with full ROM and w/o synovitis or deformity. Previously with 16/18 FMS tender spots on exam.   No dactylitis,  tenosynovitis, enthesopathy.  Skin: no nail pitting; no nodules, overall tanned appearance of skin. Left upper lateral thigh with regan 5 cm red scaly plaque with raised red serpigenous border.   Psych: nl judgement, orientation, memory, affect.  Neuro: CN II-XII grossly intact, moving all extremities equally, normal gait. Strength grossly normal.          Data:     Results for orders placed or performed in visit on 02/20/19   MA Screening Digital Bilateral   Result Value Ref Range    MAMMOGRAM      Narrative    Care everywhere       Saji Villeda MD    Lab Results   Component Value Date    WBC 6.3 02/26/2018     Lab Results   Component Value Date    RBC 4.30 02/26/2018     Lab Results   Component Value Date    HGB 13.5 02/26/2018     Lab Results   Component Value Date    HCT 41.7 02/26/2018     No components found for: MCT  Lab Results   Component Value Date    MCV 97 02/26/2018     Lab Results   Component Value Date    MCH 31.4 02/26/2018     Lab Results   Component Value Date    MCHC 32.4 02/26/2018     Lab Results   Component Value Date    RDW 12.7 02/26/2018     Lab Results   Component Value Date     02/26/2018       Lab Results   Component Value Date    AST 17 02/26/2018     Lab Results   Component Value Date    ALT 19 02/26/2018     No results found for: BILICONJ   Lab Results   Component Value Date    BILITOTAL 0.2 02/26/2018     Lab Results   Component Value Date    ALBUMIN 3.8 02/26/2018     Lab Results   Component Value Date    PROTTOTAL 7.8 02/26/2018      Lab Results   Component Value Date    ALKPHOS 128 02/26/2018       Last Basic Metabolic Panel:  Lab Results   Component Value Date     02/26/2018      Lab Results   Component Value Date    POTASSIUM 3.8 02/26/2018     Lab Results   Component Value Date    CHLORIDE 105 02/26/2018     Lab Results   Component Value  "Date    DAVY 8.9 02/26/2018     Lab Results   Component Value Date    CO2 30 02/26/2018     Lab Results   Component Value Date    BUN 10 02/26/2018     Lab Results   Component Value Date    CR 0.77 02/26/2018     Lab Results   Component Value Date    GLC 73 02/26/2018     TTE reportedly last in 2008, there was partial report pasted in prior rheumatology note:  \"Echo in 2008, obtained for history of tricuspid valve disease, showed an EF of 88%, normal LV, normal aorta, mild-to-moderate leaflet thickening of the mitral valve with trivial regurgitation, and small pericardial effusion, circumferential to the heart. Repeat evaluation was recommended in 3 months, and this was not done.\"    Labs:  Positive HAN x2, 1:80 and 1:160 (homogeneous)  Positive SSA  Low titer +aCL IgM 38 -> repeat negative   Positive LAC -> repeat positive   Negative Sm, dsDNA, aCL IgG, B2GP IgG/IgM -> B2GP IgM now low level positive  Normal CK, TSH, vitamin D   Negative ANCA  Normal C3, C4 -> repeat normal   OK Cr, CBC    MRI L shoulder: findings concerning for possible adhesive capsulitis     Reviewed all relevant data including labs and imaging.       Saji Villeda MD      "

## 2019-03-21 LAB
C3 SERPL-MCNC: 106 MG/DL (ref 76–169)
C4 SERPL-MCNC: 18 MG/DL (ref 15–50)
CARDIOLIPIN IGA SERPL-ACNC: 1.1 APL U/ML (ref 0–19.9)

## 2019-03-22 LAB
DEPRECATED CALCIDIOL+CALCIFEROL SERPL-MC: 24 UG/L (ref 20–75)
LA PPP-IMP: POSITIVE

## 2019-03-23 LAB
B2 GLYCOPROT1 IGA SER-ACNC: 1.4 U/ML
B2 GLYCOPROT1 IGG SERPL IA-ACNC: 0.7 U/ML
B2 GLYCOPROT1 IGM SERPL IA-ACNC: 5.2 U/ML
DSDNA AB SER-ACNC: 1 IU/ML

## 2019-07-17 ENCOUNTER — OFFICE VISIT (OUTPATIENT)
Dept: RHEUMATOLOGY | Facility: CLINIC | Age: 54
End: 2019-07-17
Attending: INTERNAL MEDICINE
Payer: COMMERCIAL

## 2019-07-17 VITALS
OXYGEN SATURATION: 98 % | HEIGHT: 65 IN | HEART RATE: 54 BPM | BODY MASS INDEX: 25.62 KG/M2 | WEIGHT: 153.8 LBS | SYSTOLIC BLOOD PRESSURE: 137 MMHG | DIASTOLIC BLOOD PRESSURE: 77 MMHG

## 2019-07-17 DIAGNOSIS — E55.9 VITAMIN D DEFICIENCY: ICD-10-CM

## 2019-07-17 DIAGNOSIS — E55.9 VITAMIN D DEFICIENCY: Primary | ICD-10-CM

## 2019-07-17 DIAGNOSIS — M79.7 FIBROMYALGIA: ICD-10-CM

## 2019-07-17 PROCEDURE — G0463 HOSPITAL OUTPT CLINIC VISIT: HCPCS | Mod: ZF

## 2019-07-17 PROCEDURE — 82306 VITAMIN D 25 HYDROXY: CPT | Performed by: INTERNAL MEDICINE

## 2019-07-17 PROCEDURE — 36415 COLL VENOUS BLD VENIPUNCTURE: CPT | Performed by: INTERNAL MEDICINE

## 2019-07-17 RX ORDER — GABAPENTIN 100 MG/1
200 CAPSULE ORAL AT BEDTIME
Qty: 180 CAPSULE | Refills: 11 | Status: SHIPPED | OUTPATIENT
Start: 2019-07-17 | End: 2020-07-28

## 2019-07-17 ASSESSMENT — PAIN SCALES - GENERAL: PAINLEVEL: MODERATE PAIN (4)

## 2019-07-17 ASSESSMENT — MIFFLIN-ST. JEOR: SCORE: 1303.51

## 2019-07-17 NOTE — LETTER
7/17/2019       RE: Danika Light  [address] [facility] [address]     Dear Colleague,    Thank you for referring your patient, Danika Light, to the [facility] RHEUMATOLOGY at [facility] acute [facility]. Please see a copy of my visit note below.    [facility] - Rheumatology Clinic Visit     Danika Light MRN# [id number]   YOB: 1965    Primary care provider: Dr. 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 No hx DVT/PE or recurrent blood clot or miscarriages in family. Possible hx of first trimester miscarriages in pt but these were unconfirmed. There are no guidelines regarding prophylactic/preventative anticoagulation in pts with APS based on pregnancy morbidity. Could consider ASA 81 mg qd addition.   # Fibromyalgia   # osteopenia - T score - 1.6 of L femur 1/2016  # hx of ?tricuspid valvular abnormality, do not see prior TTE  # hx of L shoulder adhesive capsulitis, significantly improved with PT  # vitamin D deficiency  #Shingrix vaccine. Recommended shingrix. CDC recommends this vaccine 2 doses,  by 2-6 months for adults 50 years and older. It is killed virus and ok to be used in immunocompromised patients. Shingrix reduces the risk of shingles and PHN by more than 90% in people 50 and older.     AE include: pain, redness and swelling at the inj site, myalgia, fatigue, headaches, shivering, fever and stomach upset.       Patient is a 54 year old female with likely SLE (vs Sjogren's syndrome) with primarily mucocutaneous and joint manifestations, moderately improved after 2 months of HCQ  use. Pt does meet SLICC criteria for SLE with +HAN, SSA, [id number] (repeat neg 3/2019), and LAC (repeat pos in 3/2019) along with oral ulcers, non-scarring alopecia, and arthralgias. She has no evidence of severe organ damage, with recently normal blood counts, liver and kidney tests. I encouraged her to continue HCQ use for potential prevention of disease progression and for treatment of her fatigue and joint symptoms. I did encourage her to follow up with ophthalmology for dry eyes. I also encouraged her to follow up with dentist given sicca-associated dental disease. I see no need to further increase immunosuppression at this time. If joint symptoms continue to be large issue and concern they are progressing (and not part of her FMS), could consider addition of AZA vs MTX. Discussed 3/2019 test results with her.        Recs:    Try systane preservative free eyedrops, consider xiidra for dry eyes    Discuss shingrix vaccine with your PCP    Vit D level check today as had low vit D 3/2019    Try gabapentin at higher dose of 200 mg at bedtime for FMS    Stop smoking given pos LAC and increased risk of thrombosis    - continue  mg qd  - follow up with ophthalmology. Needs at annual OCT screenings.   - follow up with dentist     RTC 1 yr    Orders Placed This Encounter   Procedures     Vitamin D Deficiency                   Active Problem List:     Patient Active Problem List    Diagnosis Date Noted     Other systemic lupus erythematosus with other organ involvement (H) 06/04/2018     Priority: Medium     High risk medication use 06/04/2018     Priority: Medium     Aphthous ulcer 06/04/2018     Priority: Medium     Sjogren's syndrome (H) 02/28/2018     Priority: Medium            History of Present Illness:     Chief Complaint   Patient presents with     RECHECK     lupus     Initial rheumatological visit 2/2018  The patient is a 52 year old female who presents today as a self-referral for a history of SLE. She  "reports a long-standing history of fatigue, myalgias, arthralgias, weakness, paresthesias, sicca symptoms, Raynaud's (since her 30's), alopecia, and various rashes. She was found to have a positive HAN 1:160 (homogeneous pattern) and MPO (but negative ANCA) and was referred to rheumatology in 2/2017. She was initially evaluated by Dr. Hartman of [facility] and diagnosed with fibromyalgia, chronic pain syndrome, and osteoarthritis. Additional lab testing revealed positive lupus anticoagulant and low titer + aCL IgM, with negative dsDNA, Feliz, RF/CCP, ANCA, and normal C3/C4. She was treated with zofran as needed for nausea, doxepin at night for sleep, and celexa and tramadol for FMS symptoms.     The patient reports for many years she has had various symptoms that have largely  Been unexplained. She reports developing Raynaud's of her hands/feet in her 30's, describing color change to white then red with cold exposure, but also notes atypical symptoms of this happening in the summer as well. She denies having ulcers of the tips of fingers, but does think her fingers/toes have cracking skin. She also notes a long history of joint pain including most joints involving the feet, knees, hips (points anterior groin), hands, wrists, and shoulders. Symptoms seem to be more severe on L side. She denies swelling of the joints, but does note her hands will feel puffy and it is hard to get her rings on/off. She does have some morning stiffness lasting about an hour. She also notes both muscle pain and weakness \"all over,\" but specifically points out decreased  strength. She has noticed she's dropping objects. She does recall having EMG of her arms but thinks this was normal. She has chronic back pain and sciatica of her left leg.     She also notes various rashes that are heat sensitive (not actually photosensitive or just in areas of sun exposure), with a blistering rash on her chest, back, arms, and shoulders. " "She has never seen dermatology for this and not had this biopsied. She notes numbness/tingling \"all over\" that is lightning like, lasting only seconds and occurs intermittently without obvious trigger. She has ongoing sicca symptoms for many years. Follows with ophthalmology and uses AT as needed. Has never been on restasis or xiidra. She does think she had Schirmer's testing done at some point, but she is unclear. She does not wear contacts. Regarding her dry mouth, she drinks water frequently throughout the day. She does not use biotene products and has never been on evoxac or salagen. She did have to have many teeth pulled in the last 10 years due to poor dentition. She does have GERD symptoms mostly at night. Does describe oropharyngeal dysphagia. Uses ranitidine as needed which does help. Describes brain fog symptoms.     Has chronic abdominal bloating and constipation. Some diarrhea. Eats one meal per day but gaining weight. Has had a colonoscopy several years ago that she reports was normal. Some chronic sinus problems, frequent chest colds, epistaxis. Does report oral ulcers, tiny bumps along the upper front gums that she was told may have been thrush (but didn't improve with nystatin).     Describes intermittent \"dizzy\" episodes which she describes more as lightheadedness, SOB, and palpitations. These can occur at rest, not just with activity. She has not had this worked up by cardiology or her primary. Is in between primary care providers currently as recent one retired. Reports hx of tricuspid valvular disease, although she can't recall when her last TTE was.     Describes \"flares\" of many of the above symptoms, primarily arthralgias, myalgias, weakness that will last for several weeks then resolve on own (has never been on prednisone), occurring about 5x per year. She has never been on plaquenil. Has tried tylenol and ibuprofen with some help for muscle/joint pain. Naproxen didn't help.     Doesn't sleep " well at night. Will wake up and not be able to fall back asleep due to pain. Doxepin does help her sleep some. Does not have routine exercise regimen.     Hx of 1 late 1st trimester/early 2nd trimester miscarriage that required a D&C. No hx of blood clots. No fam hx of recurrent miscarriage.     Interim hx 5/21/2018:  She is doing well today. Has been on plaquenil for several months now and does think it has helped her joint pain and flu like symptoms. Has felt some viral symptoms for past 1.5 weeks with flu like symptoms, feeling run down, fatigued, increased arthralgias and myalgias. She does not her plaquenil was changed from one brand to another and she wonders if her symptoms could be due to that. Does have oral ulcer today on her left cheek and upper gumline. Continues to have sicca symptoms. Does think salagen has helped her dry mouth. Hasn't seen dentist. Feels as though she has gritty sensation in eye. Using AT. Seeing ophthalmology soon. Some diffuse joint symptoms still but no joint swelling. Raynaud's mild and only when cold, not when reaches into freezer/fridge. No digital ulcerations. Occasional SOB. No pleuritis. No cough. Still taking doxepin at night which helps her sleep some.     3/2019: Had a bad winter with more joint pain and muscle pain. Get ache around sinuses, reportedly had neg imaging. Gets stuffy nose during winter, causes headaches with light sensitivity.    HCQ has helped with photosensitive rash.    Fingers swell up, from my waist down hurts. Legs and arms hurt and burns when she moves. Can't sleep because of stiffness which is all day long. Has intermittent hair loss patches. No oral ulcers, reportedly had neg biopsy of the oral lesion. Had EGD, colonoscopy last month and was told to have an esophageal ulcer and was told to have repeat EGD in a month and was prescribed pantoprazole, has bad GERD x 9 yr on ranitidine. On pantoprazole for a month, it helped for 3 wk, it started losing  benefit a little bit.    Reports chest wall pain with pain below shoulder blades L>R, thinks L shoulder frozen shoulder is coming back.    Pain is also pleuritic.    Her BP is up and was told to monitor. Sometimes it is low.    Salagen caused so much saliva production and nausea and she takes 1 a day prn.    No oral ulcers.    Had eye exam on HCQ past summer.    No longer tolerates the tramadol or any opioids due to nausea.    Can not tolerate cymbalta.      Today 7/17/2019: She is on CBD oil and gabapentin 100 mg at bedtime which helps with her pain. Did not tolerate 300 mg, it made her foggy. Gets back pain if she does not move. Reports high BP, BP fluctuates and her PCP tries to manage it, also saw cardiology.     Salagen causes stomach upset, she takes it prn. Mouth is dry, gets oral ulcers.    Uses restasis for dry eyes which helps but it burns eyes and not enough to control dryness.    She had eye exam few months ago for HCQ monitoring.         Review of Systems:   Complete ROS negative except for symptoms mentioned in the HPI            Past Medical History:   L shoulder adhesive capsulitis  Fibromyalgia  Chronic pain syndrome  Raynaud's phenomenon since age 30's          Social History:   Has 4 children - 2 younger that live in house and 2 out of house.    -  at [employer] today  Hasn't worked in 8 years due to her symptoms - now working part time at a [employer] on feet for several hours at a time  Occasional EtOH use  Current smoker - 2 packs/week         Family History:     GF - CVA, recurrent thrombosis  MGM - t1DM (diagnosed in 30's)  PGM - RA         Allergies:     Allergies   Allergen Reactions     Cefaclor Hives            Medications:     Current Outpatient Medications   Medication Sig Dispense Refill     doxepin (SINEQUAN) 10 MG/ML (HIGH CONC) solution 10 mg       gabapentin (NEURONTIN) 100 MG capsule Take 1 capsule (100 mg) by mouth At Bedtime 90 capsule 3     hydroxychloroquine (PLAQUENIL)  "200 MG tablet Take 1 tablet (200 mg) by mouth 2 times daily 180 tablet 3     pilocarpine (SALAGEN) 5 MG tablet Take 1 tablet up to 3x daily for dry mouth 180 tablet 2     pyridoxine (VITAMIN B-6) 50 MG TABS 50 mg       RaNITidine HCl (ZANTAC PO) Take by mouth 2 times daily       triamcinolone (KENALOG) 0.1 % paste Take by mouth 2 times daily To use on oral ulcers sparingly up to 2x per day until ulcer heals (or up to 14d) 5 g 1            Physical Exam:   Blood pressure 137/77, pulse 54, height 1.651 m (5' 5\"), weight 69.8 kg (153 lb 12.8 oz), SpO2 98 %.  Wt Readings from Last 4 Encounters:   07/17/19 69.8 kg (153 lb 12.8 oz)   03/20/19 70.7 kg (155 lb 12.8 oz)   05/21/18 68.1 kg (150 lb 1.6 oz)   02/26/18 68.2 kg (150 lb 6.4 oz)     BP Readings from Last 3 Encounters:   07/17/19 137/77   03/20/19 136/85   05/21/18 (!) 135/92       Constitutional: well-developed, appearing stated age  Eyes: PERRLA, normal conjunctiva, sclera  ENT: nl external ears, nose, lips. No mucous membrane lesions, slightly decreased salivary pool. Wearing partials on upper teeth.   Neck: no cervical or supraclavicular lymphadenopathy, no parotid swelling, normal palpable thyroid  Resp: CTAB, no wheezing, breathing comfortably on room air  CV: RRR, no m/r/g, no LE edema  GI: soft, NT/ND  : not tested  Lymph: no cervical, supraclavicular nodes  MS: no active synovitis or joint tenderness  Skin: no rash  Psych: nl affect.  Neuro: grossly non focal         Data:     Results for orders placed or performed in visit on 03/20/19   Beta 2 Glycoprotein 1 Antibody IgA   Result Value Ref Range    Beta 2 Glycoprotein 1 Antibody IgA 1.4 <7 U/mL   Cardiolipin Antibody IgA   Result Value Ref Range    Cardiolipin Antibody IgA 1.1 0.0 - 19.9 APL U/mL   Beta 2 Glycoprotein Antibodies IGG IGM   Result Value Ref Range    Beta 2 Glycoprotein 1 Antibody IgG 0.7 <7 U/mL    Beta 2 Glycoprotein 1 Antibody IgM 5.2 <7 U/mL   Lupus Anticoagulant Panel   Result Value " Ref Range    Lupus Result Positive (A) NEG^Negative   Vitamin D Deficiency   Result Value Ref Range    Vitamin D Deficiency screening 24 20 - 75 ug/L   Creatinine random urine   Result Value Ref Range    Creatinine Urine Random 146 mg/dL   Protein  random urine with Creat Ratio   Result Value Ref Range    Protein Random Urine 0.28 g/L    Protein Total Urine g/gr Creatinine 0.19 0 - 0.2 g/g Cr   UA with Microscopic reflex to Culture   Result Value Ref Range    Color Urine Yellow     Appearance Urine Clear     Glucose Urine Negative NEG^Negative mg/dL    Bilirubin Urine Negative NEG^Negative    Ketones Urine Negative NEG^Negative mg/dL    Specific Gravity Urine 1.017 1.003 - 1.035    Blood Urine Negative NEG^Negative    pH Urine 5.0 5.0 - 7.0 pH    Protein Albumin Urine Negative NEG^Negative mg/dL    Urobilinogen mg/dL 0.0 0.0 - 2.0 mg/dL    Nitrite Urine Negative NEG^Negative    Leukocyte Esterase Urine Negative NEG^Negative    Source Midstream Urine     WBC Urine 1 0 - 5 /HPF    RBC Urine <1 0 - 2 /HPF    Bacteria Urine Few (A) NEG^Negative /HPF    Squamous Epithelial /HPF Urine <1 0 - 1 /HPF    Mucous Urine Present (A) NEG^Negative /LPF   Erythrocyte sedimentation rate auto   Result Value Ref Range    Sed Rate 9 0 - 30 mm/h   DNA double stranded antibodies   Result Value Ref Range    DNA-ds 1 <10 IU/mL   CRP inflammation   Result Value Ref Range    CRP Inflammation <2.9 0.0 - 8.0 mg/L   Complement C3   Result Value Ref Range    Complement C3 106 76 - 169 mg/dL   Complement C4   Result Value Ref Range    Complement C4 18 15 - 50 mg/dL   Creatinine   Result Value Ref Range    Creatinine 0.86 0.52 - 1.04 mg/dL    GFR Estimate 77 >60 mL/min/[1.73_m2]    GFR Estimate If Black 89 >60 mL/min/[1.73_m2]   CBC with platelets differential   Result Value Ref Range    WBC 6.5 4.0 - 11.0 10e9/L    RBC Count 4.32 3.8 - 5.2 10e12/L    Hemoglobin 13.6 11.7 - 15.7 g/dL    Hematocrit 42.1 35.0 - 47.0 %    MCV 98 78 - 100 fl    MCH  31.5 26.5 - 33.0 pg    MCHC 32.3 31.5 - 36.5 g/dL    RDW 12.1 10.0 - 15.0 %    Platelet Count 180 150 - 450 10e9/L    Diff Method Automated Method     % Neutrophils 41.3 %    % Lymphocytes 42.8 %    % Monocytes 9.6 %    % Eosinophils 5.2 %    % Basophils 0.9 %    % Immature Granulocytes 0.2 %    Nucleated RBCs 0 0 /100    Absolute Neutrophil 2.7 1.6 - 8.3 10e9/L    Absolute Lymphocytes 2.8 0.8 - 5.3 10e9/L    Absolute Monocytes 0.6 0.0 - 1.3 10e9/L    Absolute Eosinophils 0.3 0.0 - 0.7 10e9/L    Absolute Basophils 0.1 0.0 - 0.2 10e9/L    Abs Immature Granulocytes 0.0 0 - 0.4 10e9/L    Absolute Nucleated RBC 0.0    AST   Result Value Ref Range    AST 20 0 - 45 U/L   Albumin level   Result Value Ref Range    Albumin 3.9 3.4 - 5.0 g/dL   ALT   Result Value Ref Range    ALT 22 0 - 50 U/L   Creatinine urine calculation only   Result Value Ref Range    Creatinine Urine 146 mg/dL     Saji Villeda MD    Lab Results   Component Value Date    WBC 6.3 02/26/2018     Lab Results   Component Value Date    RBC 4.30 02/26/2018     Lab Results   Component Value Date    HGB 13.5 02/26/2018     Lab Results   Component Value Date    HCT 41.7 02/26/2018     No components found for: MCT  Lab Results   Component Value Date    MCV 97 02/26/2018     Lab Results   Component Value Date    MCH 31.4 02/26/2018     Lab Results   Component Value Date    MCHC 32.4 02/26/2018     Lab Results   Component Value Date    RDW 12.7 02/26/2018     Lab Results   Component Value Date     02/26/2018       Lab Results   Component Value Date    AST 17 02/26/2018     Lab Results   Component Value Date    ALT 19 02/26/2018     No results found for: BILICONJ   Lab Results   Component Value Date    BILITOTAL 0.2 02/26/2018     Lab Results   Component Value Date    ALBUMIN 3.8 02/26/2018     Lab Results   Component Value Date    PROTTOTAL 7.8 02/26/2018      Lab Results   Component Value Date    ALKPHOS 128 02/26/2018       Last Basic Metabolic  "Panel:  Lab Results   Component Value Date     02/26/2018      Lab Results   Component Value Date    POTASSIUM 3.8 02/26/2018     Lab Results   Component Value Date    CHLORIDE 105 02/26/2018     Lab Results   Component Value Date    DAVY 8.9 02/26/2018     Lab Results   Component Value Date    CO2 30 02/26/2018     Lab Results   Component Value Date    BUN 10 02/26/2018     Lab Results   Component Value Date    CR 0.77 02/26/2018     Lab Results   Component Value Date    GLC 73 02/26/2018   TTE reportedly last in 2008, there was partial report pasted in prior rheumatology note:  \"Echo in 2008, obtained for history of tricuspid valve disease, showed an EF of 88%, normal LV, normal aorta, mild-to-moderate leaflet thickening of the mitral valve with trivial regurgitation, and small pericardial effusion, circumferential to the heart. Repeat evaluation was recommended in 3 months, and this was not done.\"    Labs:  Positive HAN x2, 1:80 and 1:160 (homogeneous)  Positive SSA  Low titer +aCL IgM 38 -> repeat negative   Positive LAC -> repeat positive   Negative Sm, dsDNA, aCL IgG, B2GP IgG/IgM -> B2GP IgM now low level positive  Normal CK, TSH, vitamin D   Negative ANCA  Normal C3, C4 -> repeat normal   OK Cr, CBC    MRI L shoulder: findings concerning for possible adhesive capsulitis     Reviewed all relevant data including labs and imaging.     Again, thank you for allowing me to participate in the care of your patient.      Sincerely,  Saji Villeda MD  "

## 2019-07-17 NOTE — LETTER
Patient:  Danika Light  :   1965  MRN:     [id number]        Ms.Tina Light  [address] [address]        2019    Dear ,    We are writing to inform you of your test results. Vit D is much improved, recommend taking vitamin D 2000 units a day to keep vit D around goal 40. You don't need high dose vit D prescription.      Results for orders placed or performed in visit on 19   Vitamin D Deficiency   Result Value Ref Range    Vitamin D Deficiency screening 35 20 - 75 ug/L       Saji Villeda MD

## 2019-07-17 NOTE — PATIENT INSTRUCTIONS
Try systane preservative free eyedrops, consider xiidra     Discuss shingrix vaccine with your PCP    Vit D level check today    Try gabapentin at higher dose of 200 mg at bedtime    Stop smoking    Return in a year

## 2019-07-17 NOTE — PROGRESS NOTES
[facility] - Rheumatology Clinic Visit     Danika Light MRN# [id number]   YOB: 1965    Primary care provider: Dr. 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 No hx DVT/PE or recurrent blood clot or miscarriages in family. Possible hx of first trimester miscarriages in pt but these were unconfirmed. There are no guidelines regarding prophylactic/preventative anticoagulation in pts with APS based on pregnancy morbidity. Could consider ASA 81 mg qd addition.   # Fibromyalgia   # osteopenia - T score - 1.6 of L femur 1/2016  # hx of ?tricuspid valvular abnormality, do not see prior TTE  # hx of L shoulder adhesive capsulitis, significantly improved with PT  # vitamin D deficiency  #Shingrix vaccine. Recommended shingrix. CDC recommends this vaccine 2 doses,  by 2-6 months for adults 50 years and older. It is killed virus and ok to be used in immunocompromised patients. Shingrix reduces the risk of shingles and PHN by more than 90% in people 50 and older.     AE include: pain, redness and swelling at the inj site, myalgia, fatigue, headaches, shivering, fever and stomach upset.       Patient is a 54 year old female with likely SLE (vs Sjogren's syndrome) with primarily mucocutaneous and joint manifestations, moderately improved after 2 months of HCQ use. Pt does meet SLICC criteria for SLE with +HAN, SSA, [id number] (repeat neg 3/2019), and LAC (repeat pos in 3/2019) along with oral ulcers, non-scarring alopecia, and arthralgias. She has no evidence of severe organ damage, with recently normal blood counts, liver and  kidney tests. I encouraged her to continue HCQ use for potential prevention of disease progression and for treatment of her fatigue and joint symptoms. I did encourage her to follow up with ophthalmology for dry eyes. I also encouraged her to follow up with dentist given sicca-associated dental disease. I see no need to further increase immunosuppression at this time. If joint symptoms continue to be large issue and concern they are progressing (and not part of her FMS), could consider addition of AZA vs MTX. Discussed 3/2019 test results with her.        Recs:    Try systane preservative free eyedrops, consider xiidra for dry eyes    Discuss shingrix vaccine with your PCP    Vit D level check today as had low vit D 3/2019    Try gabapentin at higher dose of 200 mg at bedtime for FMS    Stop smoking given pos LAC and increased risk of thrombosis    - continue  mg qd  - follow up with ophthalmology. Needs at annual OCT screenings.   - follow up with dentist     RTC 1 yr    Orders Placed This Encounter   Procedures     Vitamin D Deficiency                   Active Problem List:     Patient Active Problem List    Diagnosis Date Noted     Other systemic lupus erythematosus with other organ involvement (H) 06/04/2018     Priority: Medium     High risk medication use 06/04/2018     Priority: Medium     Aphthous ulcer 06/04/2018     Priority: Medium     Sjogren's syndrome (H) 02/28/2018     Priority: Medium            History of Present Illness:     Chief Complaint   Patient presents with     RECHECK     lupus     Initial rheumatological visit 2/2018  The patient is a 52 year old female who presents today as a self-referral for a history of SLE. She reports a long-standing history of fatigue, myalgias, arthralgias, weakness, paresthesias, sicca symptoms, Raynaud's (since her 30's), alopecia, and various rashes. She was found to have a positive HAN 1:160 (homogeneous pattern) and MPO (but negative ANCA) and was  "referred to rheumatology in 2/2017. She was initially evaluated by Dr. Hartman of  and diagnosed with fibromyalgia, chronic pain syndrome, and osteoarthritis. Additional lab testing revealed positive lupus anticoagulant and low titer + aCL IgM, with negative dsDNA, Feliz, RF/CCP, ANCA, and normal C3/C4. She was treated with zofran as needed for nausea, doxepin at night for sleep, and celexa and tramadol for FMS symptoms.     The patient reports for many years she has had various symptoms that have largely  Been unexplained. She reports developing Raynaud's of her hands/feet in her 30's, describing color change to white then red with cold exposure, but also notes atypical symptoms of this happening in the summer as well. She denies having ulcers of the tips of fingers, but does think her fingers/toes have cracking skin. She also notes a long history of joint pain including most joints involving the feet, knees, hips (points anterior groin), hands, wrists, and shoulders. Symptoms seem to be more severe on L side. She denies swelling of the joints, but does note her hands will feel puffy and it is hard to get her rings on/off. She does have some morning stiffness lasting about an hour. She also notes both muscle pain and weakness \"all over,\" but specifically points out decreased  strength. She has noticed she's dropping objects. She does recall having EMG of her arms but thinks this was normal. She has chronic back pain and sciatica of her left leg.     She also notes various rashes that are heat sensitive (not actually photosensitive or just in areas of sun exposure), with a blistering rash on her chest, back, arms, and shoulders. She has never seen dermatology for this and not had this biopsied. She notes numbness/tingling \"all over\" that is lightning like, lasting only seconds and occurs intermittently without obvious trigger. She has ongoing sicca symptoms for many years. Follows with " "ophthalmology and uses AT as needed. Has never been on restasis or xiidra. She does think she had Schirmer's testing done at some point, but she is unclear. She does not wear contacts. Regarding her dry mouth, she drinks water frequently throughout the day. She does not use biotene products and has never been on evoxac or salagen. She did have to have many teeth pulled in the last 10 years due to poor dentition. She does have GERD symptoms mostly at night. Does describe oropharyngeal dysphagia. Uses ranitidine as needed which does help. Describes brain fog symptoms.     Has chronic abdominal bloating and constipation. Some diarrhea. Eats one meal per day but gaining weight. Has had a colonoscopy several years ago that she reports was normal. Some chronic sinus problems, frequent chest colds, epistaxis. Does report oral ulcers, tiny bumps along the upper front gums that she was told may have been thrush (but didn't improve with nystatin).     Describes intermittent \"dizzy\" episodes which she describes more as lightheadedness, SOB, and palpitations. These can occur at rest, not just with activity. She has not had this worked up by cardiology or her primary. Is in between primary care providers currently as recent one retired. Reports hx of tricuspid valvular disease, although she can't recall when her last TTE was.     Describes \"flares\" of many of the above symptoms, primarily arthralgias, myalgias, weakness that will last for several weeks then resolve on own (has never been on prednisone), occurring about 5x per year. She has never been on plaquenil. Has tried tylenol and ibuprofen with some help for muscle/joint pain. Naproxen didn't help.     Doesn't sleep well at night. Will wake up and not be able to fall back asleep due to pain. Doxepin does help her sleep some. Does not have routine exercise regimen.     Hx of 1 late 1st trimester/early 2nd trimester miscarriage that required a D&C. No hx of blood clots. No " fam hx of recurrent miscarriage.     Interim hx 5/21/2018:  She is doing well today. Has been on plaquenil for several months now and does think it has helped her joint pain and flu like symptoms. Has felt some viral symptoms for past 1.5 weeks with flu like symptoms, feeling run down, fatigued, increased arthralgias and myalgias. She does not her plaquenil was changed from one brand to another and she wonders if her symptoms could be due to that. Does have oral ulcer today on her left cheek and upper gumline. Continues to have sicca symptoms. Does think salagen has helped her dry mouth. Hasn't seen dentist. Feels as though she has gritty sensation in eye. Using AT. Seeing ophthalmology soon. Some diffuse joint symptoms still but no joint swelling. Raynaud's mild and only when cold, not when reaches into freezer/fridge. No digital ulcerations. Occasional SOB. No pleuritis. No cough. Still taking doxepin at night which helps her sleep some.     3/2019: Had a bad winter with more joint pain and muscle pain. Get ache around sinuses, reportedly had neg imaging. Gets stuffy nose during winter, causes headaches with light sensitivity.    HCQ has helped with photosensitive rash.    Fingers swell up, from my waist down hurts. Legs and arms hurt and burns when she moves. Can't sleep because of stiffness which is all day long. Has intermittent hair loss patches. No oral ulcers, reportedly had neg biopsy of the oral lesion. Had EGD, colonoscopy last month and was told to have an esophageal ulcer and was told to have repeat EGD in a month and was prescribed pantoprazole, has bad GERD x 9 yr on ranitidine. On pantoprazole for a month, it helped for 3 wk, it started losing benefit a little bit.    Reports chest wall pain with pain below shoulder blades L>R, thinks L shoulder frozen shoulder is coming back.    Pain is also pleuritic.    Her BP is up and was told to monitor. Sometimes it is low.    Salagen caused so much saliva  production and nausea and she takes 1 a day prn.    No oral ulcers.    Had eye exam on HCQ past summer.    No longer tolerates the tramadol or any opioids due to nausea.    Can not tolerate cymbalta.      Today 7/17/2019: She is on CBD oil and gabapentin 100 mg at bedtime which helps with her pain. Did not tolerate 300 mg, it made her foggy. Gets back pain if she does not move. Reports high BP, BP fluctuates and her PCP tries to manage it, also saw cardiology.     Salagen causes stomach upset, she takes it prn. Mouth is dry, gets oral ulcers.    Uses restasis for dry eyes which helps but it burns eyes and not enough to control dryness.    She had eye exam few months ago for HCQ monitoring.         Review of Systems:   Complete ROS negative except for symptoms mentioned in the HPI            Past Medical History:   L shoulder adhesive capsulitis  Fibromyalgia  Chronic pain syndrome  Raynaud's phenomenon since age 30's          Social History:   Has 4 children - 2 younger that live in house and 2 out of house.    -  at [employer] today  Hasn't worked in 8 years due to her symptoms - now working part time at a [employer] on feet for several hours at a time  Occasional EtOH use  Current smoker - 2 packs/week         Family History:     GF - CVA, recurrent thrombosis  MGM - t1DM (diagnosed in 30's)  PGM - RA         Allergies:     Allergies   Allergen Reactions     Cefaclor Hives            Medications:     Current Outpatient Medications   Medication Sig Dispense Refill     doxepin (SINEQUAN) 10 MG/ML (HIGH CONC) solution 10 mg       gabapentin (NEURONTIN) 100 MG capsule Take 1 capsule (100 mg) by mouth At Bedtime 90 capsule 3     hydroxychloroquine (PLAQUENIL) 200 MG tablet Take 1 tablet (200 mg) by mouth 2 times daily 180 tablet 3     pilocarpine (SALAGEN) 5 MG tablet Take 1 tablet up to 3x daily for dry mouth 180 tablet 2     pyridoxine (VITAMIN B-6) 50 MG TABS 50 mg       RaNITidine HCl (ZANTAC PO) Take by  "mouth 2 times daily       triamcinolone (KENALOG) 0.1 % paste Take by mouth 2 times daily To use on oral ulcers sparingly up to 2x per day until ulcer heals (or up to 14d) 5 g 1            Physical Exam:   Blood pressure 137/77, pulse 54, height 1.651 m (5' 5\"), weight 69.8 kg (153 lb 12.8 oz), SpO2 98 %.  Wt Readings from Last 4 Encounters:   07/17/19 69.8 kg (153 lb 12.8 oz)   03/20/19 70.7 kg (155 lb 12.8 oz)   05/21/18 68.1 kg (150 lb 1.6 oz)   02/26/18 68.2 kg (150 lb 6.4 oz)     BP Readings from Last 3 Encounters:   07/17/19 137/77   03/20/19 136/85   05/21/18 (!) 135/92       Constitutional: well-developed, appearing stated age  Eyes: PERRLA, normal conjunctiva, sclera  ENT: nl external ears, nose, lips. No mucous membrane lesions, slightly decreased salivary pool. Wearing partials on upper teeth.   Neck: no cervical or supraclavicular lymphadenopathy, no parotid swelling, normal palpable thyroid  Resp: CTAB, no wheezing, breathing comfortably on room air  CV: RRR, no m/r/g, no LE edema  GI: soft, NT/ND  : not tested  Lymph: no cervical, supraclavicular nodes  MS: no active synovitis or joint tenderness  Skin: no rash  Psych: nl affect.  Neuro: grossly non focal         Data:     Results for orders placed or performed in visit on 03/20/19   Beta 2 Glycoprotein 1 Antibody IgA   Result Value Ref Range    Beta 2 Glycoprotein 1 Antibody IgA 1.4 <7 U/mL   Cardiolipin Antibody IgA   Result Value Ref Range    Cardiolipin Antibody IgA 1.1 0.0 - 19.9 APL U/mL   Beta 2 Glycoprotein Antibodies IGG IGM   Result Value Ref Range    Beta 2 Glycoprotein 1 Antibody IgG 0.7 <7 U/mL    Beta 2 Glycoprotein 1 Antibody IgM 5.2 <7 U/mL   Lupus Anticoagulant Panel   Result Value Ref Range    Lupus Result Positive (A) NEG^Negative   Vitamin D Deficiency   Result Value Ref Range    Vitamin D Deficiency screening 24 20 - 75 ug/L   Creatinine random urine   Result Value Ref Range    Creatinine Urine Random 146 mg/dL   Protein  random " urine with Creat Ratio   Result Value Ref Range    Protein Random Urine 0.28 g/L    Protein Total Urine g/gr Creatinine 0.19 0 - 0.2 g/g Cr   UA with Microscopic reflex to Culture   Result Value Ref Range    Color Urine Yellow     Appearance Urine Clear     Glucose Urine Negative NEG^Negative mg/dL    Bilirubin Urine Negative NEG^Negative    Ketones Urine Negative NEG^Negative mg/dL    Specific Gravity Urine 1.017 1.003 - 1.035    Blood Urine Negative NEG^Negative    pH Urine 5.0 5.0 - 7.0 pH    Protein Albumin Urine Negative NEG^Negative mg/dL    Urobilinogen mg/dL 0.0 0.0 - 2.0 mg/dL    Nitrite Urine Negative NEG^Negative    Leukocyte Esterase Urine Negative NEG^Negative    Source Midstream Urine     WBC Urine 1 0 - 5 /HPF    RBC Urine <1 0 - 2 /HPF    Bacteria Urine Few (A) NEG^Negative /HPF    Squamous Epithelial /HPF Urine <1 0 - 1 /HPF    Mucous Urine Present (A) NEG^Negative /LPF   Erythrocyte sedimentation rate auto   Result Value Ref Range    Sed Rate 9 0 - 30 mm/h   DNA double stranded antibodies   Result Value Ref Range    DNA-ds 1 <10 IU/mL   CRP inflammation   Result Value Ref Range    CRP Inflammation <2.9 0.0 - 8.0 mg/L   Complement C3   Result Value Ref Range    Complement C3 106 76 - 169 mg/dL   Complement C4   Result Value Ref Range    Complement C4 18 15 - 50 mg/dL   Creatinine   Result Value Ref Range    Creatinine 0.86 0.52 - 1.04 mg/dL    GFR Estimate 77 >60 mL/min/[1.73_m2]    GFR Estimate If Black 89 >60 mL/min/[1.73_m2]   CBC with platelets differential   Result Value Ref Range    WBC 6.5 4.0 - 11.0 10e9/L    RBC Count 4.32 3.8 - 5.2 10e12/L    Hemoglobin 13.6 11.7 - 15.7 g/dL    Hematocrit 42.1 35.0 - 47.0 %    MCV 98 78 - 100 fl    MCH 31.5 26.5 - 33.0 pg    MCHC 32.3 31.5 - 36.5 g/dL    RDW 12.1 10.0 - 15.0 %    Platelet Count 180 150 - 450 10e9/L    Diff Method Automated Method     % Neutrophils 41.3 %    % Lymphocytes 42.8 %    % Monocytes 9.6 %    % Eosinophils 5.2 %    % Basophils 0.9  %    % Immature Granulocytes 0.2 %    Nucleated RBCs 0 0 /100    Absolute Neutrophil 2.7 1.6 - 8.3 10e9/L    Absolute Lymphocytes 2.8 0.8 - 5.3 10e9/L    Absolute Monocytes 0.6 0.0 - 1.3 10e9/L    Absolute Eosinophils 0.3 0.0 - 0.7 10e9/L    Absolute Basophils 0.1 0.0 - 0.2 10e9/L    Abs Immature Granulocytes 0.0 0 - 0.4 10e9/L    Absolute Nucleated RBC 0.0    AST   Result Value Ref Range    AST 20 0 - 45 U/L   Albumin level   Result Value Ref Range    Albumin 3.9 3.4 - 5.0 g/dL   ALT   Result Value Ref Range    ALT 22 0 - 50 U/L   Creatinine urine calculation only   Result Value Ref Range    Creatinine Urine 146 mg/dL       Saji Villeda MD    Lab Results   Component Value Date    WBC 6.3 02/26/2018     Lab Results   Component Value Date    RBC 4.30 02/26/2018     Lab Results   Component Value Date    HGB 13.5 02/26/2018     Lab Results   Component Value Date    HCT 41.7 02/26/2018     No components found for: MCT  Lab Results   Component Value Date    MCV 97 02/26/2018     Lab Results   Component Value Date    MCH 31.4 02/26/2018     Lab Results   Component Value Date    MCHC 32.4 02/26/2018     Lab Results   Component Value Date    RDW 12.7 02/26/2018     Lab Results   Component Value Date     02/26/2018       Lab Results   Component Value Date    AST 17 02/26/2018     Lab Results   Component Value Date    ALT 19 02/26/2018     No results found for: BILICONJ   Lab Results   Component Value Date    BILITOTAL 0.2 02/26/2018     Lab Results   Component Value Date    ALBUMIN 3.8 02/26/2018     Lab Results   Component Value Date    PROTTOTAL 7.8 02/26/2018      Lab Results   Component Value Date    ALKPHOS 128 02/26/2018       Last Basic Metabolic Panel:  Lab Results   Component Value Date     02/26/2018      Lab Results   Component Value Date    POTASSIUM 3.8 02/26/2018     Lab Results   Component Value Date    CHLORIDE 105 02/26/2018     Lab Results   Component Value Date    DAVY 8.9 02/26/2018  "    Lab Results   Component Value Date    CO2 30 02/26/2018     Lab Results   Component Value Date    BUN 10 02/26/2018     Lab Results   Component Value Date    CR 0.77 02/26/2018     Lab Results   Component Value Date    GLC 73 02/26/2018     TTE reportedly last in 2008, there was partial report pasted in prior rheumatology note:  \"Echo in 2008, obtained for history of tricuspid valve disease, showed an EF of 88%, normal LV, normal aorta, mild-to-moderate leaflet thickening of the mitral valve with trivial regurgitation, and small pericardial effusion, circumferential to the heart. Repeat evaluation was recommended in 3 months, and this was not done.\"    Labs:  Positive AHN x2, 1:80 and 1:160 (homogeneous)  Positive SSA  Low titer +aCL IgM 38 -> repeat negative   Positive LAC -> repeat positive   Negative Sm, dsDNA, aCL IgG, B2GP IgG/IgM -> B2GP IgM now low level positive  Normal CK, TSH, vitamin D   Negative ANCA  Normal C3, C4 -> repeat normal   OK Cr, CBC    MRI L shoulder: findings concerning for possible adhesive capsulitis     Reviewed all relevant data including labs and imaging.       "

## 2019-07-17 NOTE — LETTER
7/17/2019      RE: Danika Light  [id number] [facility] [id number]       [facility] - Rheumatology Clinic Visit     Danika Light MRN# [id number]   YOB: 1965    Primary care provider: Dr. Jose Alejandro Obregon through 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 No hx DVT/PE or recurrent blood clot or miscarriages in family. Possible hx of first trimester miscarriages in pt but these were unconfirmed. There are no guidelines regarding prophylactic/preventative anticoagulation in pts with APS based on pregnancy morbidity. Could consider ASA 81 mg qd addition.   # Fibromyalgia   # osteopenia - T score - 1.6 of L femur 1/2016  # hx of ?tricuspid valvular abnormality, do not see prior TTE  # hx of L shoulder adhesive capsulitis, significantly improved with PT  # vitamin D deficiency  #Shingrix vaccine. Recommended shingrix. CDC recommends this vaccine 2 doses,  by 2-6 months for adults 50 years and older. It is killed virus and ok to be used in immunocompromised patients. Shingrix reduces the risk of shingles and PHN by more than 90% in people 50 and older.     AE include: pain, redness and swelling at the inj site, myalgia, fatigue, headaches, shivering, fever and stomach upset.       Patient is a 54 year old female with likely SLE (vs Sjogren's syndrome) with primarily mucocutaneous and joint manifestations, moderately improved after 2 months of HCQ use. Pt does meet SLICC criteria for SLE with +HNA, SSA, [id number] (repeat neg 3/2019), and LAC (repeat pos in 3/2019) along with oral ulcers, non-scarring alopecia, and arthralgias. She has no  evidence of severe organ damage, with recently normal blood counts, liver and kidney tests. I encouraged her to continue HCQ use for potential prevention of disease progression and for treatment of her fatigue and joint symptoms. I did encourage her to follow up with ophthalmology for dry eyes. I also encouraged her to follow up with dentist given sicca-associated dental disease. I see no need to further increase immunosuppression at this time. If joint symptoms continue to be large issue and concern they are progressing (and not part of her FMS), could consider addition of AZA vs MTX. Discussed 3/2019 test results with her.        Recs:    Try systane preservative free eyedrops, consider xiidra for dry eyes    Discuss shingrix vaccine with your PCP    Vit D level check today as had low vit D 3/2019    Try gabapentin at higher dose of 200 mg at bedtime for FMS    Stop smoking given pos LAC and increased risk of thrombosis    - continue  mg qd  - follow up with ophthalmology. Needs at annual OCT screenings.   - follow up with dentist     RTC 1 yr    Orders Placed This Encounter   Procedures     Vitamin D Deficiency                   Active Problem List:     Patient Active Problem List    Diagnosis Date Noted     Other systemic lupus erythematosus with other organ involvement (H) 06/04/2018     Priority: Medium     High risk medication use 06/04/2018     Priority: Medium     Aphthous ulcer 06/04/2018     Priority: Medium     Sjogren's syndrome (H) 02/28/2018     Priority: Medium            History of Present Illness:     Chief Complaint   Patient presents with     RECHECK     lupus     Initial rheumatological visit 2/2018  The patient is a 52 year old female who presents today as a self-referral for a history of SLE. She reports a long-standing history of fatigue, myalgias, arthralgias, weakness, paresthesias, sicca symptoms, Raynaud's (since her 30's), alopecia, and various rashes. She was found to have a  "positive HAN 1:160 (homogeneous pattern) and MPO (but negative ANCA) and was referred to rheumatology in 2/2017. She was initially evaluated by Dr. Hartman of [facility] and diagnosed with fibromyalgia, chronic pain syndrome, and osteoarthritis. Additional lab testing revealed positive lupus anticoagulant and low titer + aCL IgM, with negative dsDNA, Feliz, RF/CCP, ANCA, and normal C3/C4. She was treated with zofran as needed for nausea, doxepin at night for sleep, and celexa and tramadol for FMS symptoms.     The patient reports for many years she has had various symptoms that have largely  Been unexplained. She reports developing Raynaud's of her hands/feet in her 30's, describing color change to white then red with cold exposure, but also notes atypical symptoms of this happening in the summer as well. She denies having ulcers of the tips of fingers, but does think her fingers/toes have cracking skin. She also notes a long history of joint pain including most joints involving the feet, knees, hips (points anterior groin), hands, wrists, and shoulders. Symptoms seem to be more severe on L side. She denies swelling of the joints, but does note her hands will feel puffy and it is hard to get her rings on/off. She does have some morning stiffness lasting about an hour. She also notes both muscle pain and weakness \"all over,\" but specifically points out decreased  strength. She has noticed she's dropping objects. She does recall having EMG of her arms but thinks this was normal. She has chronic back pain and sciatica of her left leg.     She also notes various rashes that are heat sensitive (not actually photosensitive or just in areas of sun exposure), with a blistering rash on her chest, back, arms, and shoulders. She has never seen dermatology for this and not had this biopsied. She notes numbness/tingling \"all over\" that is lightning like, lasting only seconds and occurs intermittently without " "obvious trigger. She has ongoing sicca symptoms for many years. Follows with ophthalmology and uses AT as needed. Has never been on restasis or xiidra. She does think she had Schirmer's testing done at some point, but she is unclear. She does not wear contacts. Regarding her dry mouth, she drinks water frequently throughout the day. She does not use biotene products and has never been on evoxac or salagen. She did have to have many teeth pulled in the last 10 years due to poor dentition. She does have GERD symptoms mostly at night. Does describe oropharyngeal dysphagia. Uses ranitidine as needed which does help. Describes brain fog symptoms.     Has chronic abdominal bloating and constipation. Some diarrhea. Eats one meal per day but gaining weight. Has had a colonoscopy several years ago that she reports was normal. Some chronic sinus problems, frequent chest colds, epistaxis. Does report oral ulcers, tiny bumps along the upper front gums that she was told may have been thrush (but didn't improve with nystatin).     Describes intermittent \"dizzy\" episodes which she describes more as lightheadedness, SOB, and palpitations. These can occur at rest, not just with activity. She has not had this worked up by cardiology or her primary. Is in between primary care providers currently as recent one retired. Reports hx of tricuspid valvular disease, although she can't recall when her last TTE was.     Describes \"flares\" of many of the above symptoms, primarily arthralgias, myalgias, weakness that will last for several weeks then resolve on own (has never been on prednisone), occurring about 5x per year. She has never been on plaquenil. Has tried tylenol and ibuprofen with some help for muscle/joint pain. Naproxen didn't help.     Doesn't sleep well at night. Will wake up and not be able to fall back asleep due to pain. Doxepin does help her sleep some. Does not have routine exercise regimen.     Hx of 1 late 1st " trimester/early 2nd trimester miscarriage that required a D&C. No hx of blood clots. No fam hx of recurrent miscarriage.     Interim hx 5/21/2018:  She is doing well today. Has been on plaquenil for several months now and does think it has helped her joint pain and flu like symptoms. Has felt some viral symptoms for past 1.5 weeks with flu like symptoms, feeling run down, fatigued, increased arthralgias and myalgias. She does not her plaquenil was changed from one brand to another and she wonders if her symptoms could be due to that. Does have oral ulcer today on her left cheek and upper gumline. Continues to have sicca symptoms. Does think salagen has helped her dry mouth. Hasn't seen dentist. Feels as though she has gritty sensation in eye. Using AT. Seeing ophthalmology soon. Some diffuse joint symptoms still but no joint swelling. Raynaud's mild and only when cold, not when reaches into freezer/fridge. No digital ulcerations. Occasional SOB. No pleuritis. No cough. Still taking doxepin at night which helps her sleep some.     3/2019: Had a bad winter with more joint pain and muscle pain. Get ache around sinuses, reportedly had neg imaging. Gets stuffy nose during winter, causes headaches with light sensitivity.    HCQ has helped with photosensitive rash.    Fingers swell up, from my waist down hurts. Legs and arms hurt and burns when she moves. Can't sleep because of stiffness which is all day long. Has intermittent hair loss patches. No oral ulcers, reportedly had neg biopsy of the oral lesion. Had EGD, colonoscopy last month and was told to have an esophageal ulcer and was told to have repeat EGD in a month and was prescribed pantoprazole, has bad GERD x 9 yr on ranitidine. On pantoprazole for a month, it helped for 3 wk, it started losing benefit a little bit.    Reports chest wall pain with pain below shoulder blades L>R, thinks L shoulder frozen shoulder is coming back.    Pain is also pleuritic.    Her BP  is up and was told to monitor. Sometimes it is low.    Salagen caused so much saliva production and nausea and she takes 1 a day prn.    No oral ulcers.    Had eye exam on HCQ past summer.    No longer tolerates the tramadol or any opioids due to nausea.    Can not tolerate cymbalta.      Today 7/17/2019: She is on CBD oil and gabapentin 100 mg at bedtime which helps with her pain. Did not tolerate 300 mg, it made her foggy. Gets back pain if she does not move. Reports high BP, BP fluctuates and her PCP tries to manage it, also saw cardiology.     Salagen causes stomach upset, she takes it prn. Mouth is dry, gets oral ulcers.    Uses restasis for dry eyes which helps but it burns eyes and not enough to control dryness.    She had eye exam few months ago for HCQ monitoring.         Review of Systems:   Complete ROS negative except for symptoms mentioned in the HPI            Past Medical History:   L shoulder adhesive capsulitis  Fibromyalgia  Chronic pain syndrome  Raynaud's phenomenon since age 30's          Social History:   Has 4 children - 2 younger that live in house and 2 out of house.    -  at [employer] today  Hasn't worked in 8 years due to her symptoms - now working part time at a [employer] for several hours at a time  Occasional EtOH use  Current smoker - 2 packs/week         Family History:     GF - CVA, recurrent thrombosis  MGM - t1DM (diagnosed in 30's)  PGM - RA         Allergies:     Allergies   Allergen Reactions     Cefaclor Hives            Medications:     Current Outpatient Medications   Medication Sig Dispense Refill     doxepin (SINEQUAN) 10 MG/ML (HIGH CONC) solution 10 mg       gabapentin (NEURONTIN) 100 MG capsule Take 1 capsule (100 mg) by mouth At Bedtime 90 capsule 3     hydroxychloroquine (PLAQUENIL) 200 MG tablet Take 1 tablet (200 mg) by mouth 2 times daily 180 tablet 3     pilocarpine (SALAGEN) 5 MG tablet Take 1 tablet up to 3x daily for dry mouth 180 tablet 2      "pyridoxine (VITAMIN B-6) 50 MG TABS 50 mg       RaNITidine HCl (ZANTAC PO) Take by mouth 2 times daily       triamcinolone (KENALOG) 0.1 % paste Take by mouth 2 times daily To use on oral ulcers sparingly up to 2x per day until ulcer heals (or up to 14d) 5 g 1            Physical Exam:   Blood pressure 137/77, pulse 54, height 1.651 m (5' 5\"), weight 69.8 kg (153 lb 12.8 oz), SpO2 98 %.  Wt Readings from Last 4 Encounters:   07/17/19 69.8 kg (153 lb 12.8 oz)   03/20/19 70.7 kg (155 lb 12.8 oz)   05/21/18 68.1 kg (150 lb 1.6 oz)   02/26/18 68.2 kg (150 lb 6.4 oz)     BP Readings from Last 3 Encounters:   07/17/19 137/77   03/20/19 136/85   05/21/18 (!) 135/92       Constitutional: well-developed, appearing stated age  Eyes: PERRLA, normal conjunctiva, sclera  ENT: nl external ears, nose, lips. No mucous membrane lesions, slightly decreased salivary pool. Wearing partials on upper teeth.   Neck: no cervical or supraclavicular lymphadenopathy, no parotid swelling, normal palpable thyroid  Resp: CTAB, no wheezing, breathing comfortably on room air  CV: RRR, no m/r/g, no LE edema  GI: soft, NT/ND  : not tested  Lymph: no cervical, supraclavicular nodes  MS: no active synovitis or joint tenderness  Skin: no rash  Psych: nl affect.  Neuro: grossly non focal         Data:     Results for orders placed or performed in visit on 03/20/19   Beta 2 Glycoprotein 1 Antibody IgA   Result Value Ref Range    Beta 2 Glycoprotein 1 Antibody IgA 1.4 <7 U/mL   Cardiolipin Antibody IgA   Result Value Ref Range    Cardiolipin Antibody IgA 1.1 0.0 - 19.9 APL U/mL   Beta 2 Glycoprotein Antibodies IGG IGM   Result Value Ref Range    Beta 2 Glycoprotein 1 Antibody IgG 0.7 <7 U/mL    Beta 2 Glycoprotein 1 Antibody IgM 5.2 <7 U/mL   Lupus Anticoagulant Panel   Result Value Ref Range    Lupus Result Positive (A) NEG^Negative   Vitamin D Deficiency   Result Value Ref Range    Vitamin D Deficiency screening 24 20 - 75 ug/L   Creatinine random " urine   Result Value Ref Range    Creatinine Urine Random 146 mg/dL   Protein  random urine with Creat Ratio   Result Value Ref Range    Protein Random Urine 0.28 g/L    Protein Total Urine g/gr Creatinine 0.19 0 - 0.2 g/g Cr   UA with Microscopic reflex to Culture   Result Value Ref Range    Color Urine Yellow     Appearance Urine Clear     Glucose Urine Negative NEG^Negative mg/dL    Bilirubin Urine Negative NEG^Negative    Ketones Urine Negative NEG^Negative mg/dL    Specific Gravity Urine 1.017 1.003 - 1.035    Blood Urine Negative NEG^Negative    pH Urine 5.0 5.0 - 7.0 pH    Protein Albumin Urine Negative NEG^Negative mg/dL    Urobilinogen mg/dL 0.0 0.0 - 2.0 mg/dL    Nitrite Urine Negative NEG^Negative    Leukocyte Esterase Urine Negative NEG^Negative    Source Midstream Urine     WBC Urine 1 0 - 5 /HPF    RBC Urine <1 0 - 2 /HPF    Bacteria Urine Few (A) NEG^Negative /HPF    Squamous Epithelial /HPF Urine <1 0 - 1 /HPF    Mucous Urine Present (A) NEG^Negative /LPF   Erythrocyte sedimentation rate auto   Result Value Ref Range    Sed Rate 9 0 - 30 mm/h   DNA double stranded antibodies   Result Value Ref Range    DNA-ds 1 <10 IU/mL   CRP inflammation   Result Value Ref Range    CRP Inflammation <2.9 0.0 - 8.0 mg/L   Complement C3   Result Value Ref Range    Complement C3 106 76 - 169 mg/dL   Complement C4   Result Value Ref Range    Complement C4 18 15 - 50 mg/dL   Creatinine   Result Value Ref Range    Creatinine 0.86 0.52 - 1.04 mg/dL    GFR Estimate 77 >60 mL/min/[1.73_m2]    GFR Estimate If Black 89 >60 mL/min/[1.73_m2]   CBC with platelets differential   Result Value Ref Range    WBC 6.5 4.0 - 11.0 10e9/L    RBC Count 4.32 3.8 - 5.2 10e12/L    Hemoglobin 13.6 11.7 - 15.7 g/dL    Hematocrit 42.1 35.0 - 47.0 %    MCV 98 78 - 100 fl    MCH 31.5 26.5 - 33.0 pg    MCHC 32.3 31.5 - 36.5 g/dL    RDW 12.1 10.0 - 15.0 %    Platelet Count 180 150 - 450 10e9/L    Diff Method Automated Method     % Neutrophils 41.3 %     % Lymphocytes 42.8 %    % Monocytes 9.6 %    % Eosinophils 5.2 %    % Basophils 0.9 %    % Immature Granulocytes 0.2 %    Nucleated RBCs 0 0 /100    Absolute Neutrophil 2.7 1.6 - 8.3 10e9/L    Absolute Lymphocytes 2.8 0.8 - 5.3 10e9/L    Absolute Monocytes 0.6 0.0 - 1.3 10e9/L    Absolute Eosinophils 0.3 0.0 - 0.7 10e9/L    Absolute Basophils 0.1 0.0 - 0.2 10e9/L    Abs Immature Granulocytes 0.0 0 - 0.4 10e9/L    Absolute Nucleated RBC 0.0    AST   Result Value Ref Range    AST 20 0 - 45 U/L   Albumin level   Result Value Ref Range    Albumin 3.9 3.4 - 5.0 g/dL   ALT   Result Value Ref Range    ALT 22 0 - 50 U/L   Creatinine urine calculation only   Result Value Ref Range    Creatinine Urine 146 mg/dL       Saji Villeda MD    Lab Results   Component Value Date    WBC 6.3 02/26/2018     Lab Results   Component Value Date    RBC 4.30 02/26/2018     Lab Results   Component Value Date    HGB 13.5 02/26/2018     Lab Results   Component Value Date    HCT 41.7 02/26/2018     No components found for: MCT  Lab Results   Component Value Date    MCV 97 02/26/2018     Lab Results   Component Value Date    MCH 31.4 02/26/2018     Lab Results   Component Value Date    MCHC 32.4 02/26/2018     Lab Results   Component Value Date    RDW 12.7 02/26/2018     Lab Results   Component Value Date     02/26/2018       Lab Results   Component Value Date    AST 17 02/26/2018     Lab Results   Component Value Date    ALT 19 02/26/2018     No results found for: BILICONJ   Lab Results   Component Value Date    BILITOTAL 0.2 02/26/2018     Lab Results   Component Value Date    ALBUMIN 3.8 02/26/2018     Lab Results   Component Value Date    PROTTOTAL 7.8 02/26/2018      Lab Results   Component Value Date    ALKPHOS 128 02/26/2018       Last Basic Metabolic Panel:  Lab Results   Component Value Date     02/26/2018      Lab Results   Component Value Date    POTASSIUM 3.8 02/26/2018     Lab Results   Component Value Date     "CHLORIDE 105 02/26/2018     Lab Results   Component Value Date    DAVY 8.9 02/26/2018     Lab Results   Component Value Date    CO2 30 02/26/2018     Lab Results   Component Value Date    BUN 10 02/26/2018     Lab Results   Component Value Date    CR 0.77 02/26/2018     Lab Results   Component Value Date    GLC 73 02/26/2018     TTE reportedly last in 2008, there was partial report pasted in prior rheumatology note:  \"Echo in 2008, obtained for history of tricuspid valve disease, showed an EF of 88%, normal LV, normal aorta, mild-to-moderate leaflet thickening of the mitral valve with trivial regurgitation, and small pericardial effusion, circumferential to the heart. Repeat evaluation was recommended in 3 months, and this was not done.\"    Labs:  Positive HAN x2, 1:80 and 1:160 (homogeneous)  Positive SSA  Low titer +aCL IgM 38 -> repeat negative   Positive LAC -> repeat positive   Negative Sm, dsDNA, aCL IgG, B2GP IgG/IgM -> B2GP IgM now low level positive  Normal CK, TSH, vitamin D   Negative ANCA  Normal C3, C4 -> repeat normal   OK Cr, CBC    MRI L shoulder: findings concerning for possible adhesive capsulitis     Reviewed all relevant data including labs and imaging.     Saji Villeda MD      "

## 2019-07-18 LAB — DEPRECATED CALCIDIOL+CALCIFEROL SERPL-MC: 35 UG/L (ref 20–75)

## 2019-07-19 NOTE — RESULT ENCOUNTER NOTE
Vit D is much improved, recommend taking vitamin D 2000 units a day to keep vit D around goal 40. You don't need high dose vit D prescription.

## 2020-07-23 DIAGNOSIS — M79.7 FIBROMYALGIA: ICD-10-CM

## 2020-07-28 RX ORDER — GABAPENTIN 100 MG/1
200 CAPSULE ORAL AT BEDTIME
Qty: 180 CAPSULE | Refills: 0 | Status: SHIPPED | OUTPATIENT
Start: 2020-07-28 | End: 2020-11-05

## 2020-07-28 NOTE — TELEPHONE ENCOUNTER
GABAPENTIN 100MG CAPSULE      Last Written Prescription Date:  7/17/2019  Last Fill Quantity: 180,   # refills: 11  Last Office Visit : 7/17/2019  Future Office visit:  None    Routing refill request to provider for review/approval because:  Drug not on the [facility], P or [facility] refill protocol or controlled substance    Barbara Ceja RN  Central Triage Red Flags/Med Refills

## 2020-11-02 DIAGNOSIS — M79.7 FIBROMYALGIA: ICD-10-CM

## 2020-11-05 NOTE — TELEPHONE ENCOUNTER
GABAPENTIN 100MG CAPSULE   Last Written Prescription Date:  7/28/2020  Last Fill Quantity: 180,   # refills: 0  Last Office Visit : 7/17/2019  Future Office visit:  None  Routing refill request to provider for review/approval because:  Drug not on the [facility], P or [facility] refill protocol or controlled substance      Barbara Ceja RN  Central Triage Red Flags/Med Refills

## 2020-11-06 RX ORDER — GABAPENTIN 100 MG/1
200 CAPSULE ORAL AT BEDTIME
Qty: 180 CAPSULE | Refills: 1 | Status: SHIPPED | OUTPATIENT
Start: 2020-11-06 | End: 2021-04-19

## 2021-04-17 DIAGNOSIS — M79.7 FIBROMYALGIA: ICD-10-CM

## 2021-04-20 RX ORDER — GABAPENTIN 100 MG/1
200 CAPSULE ORAL AT BEDTIME
Qty: 180 CAPSULE | Refills: 0 | Status: SHIPPED | OUTPATIENT
Start: 2021-04-20

## 2021-07-07 DIAGNOSIS — M79.7 FIBROMYALGIA: ICD-10-CM

## 2021-07-09 NOTE — TELEPHONE ENCOUNTER
gabapentin (NEURONTIN) 100 MG capsule   Take 2 capsules (200 mg) by mouth At Bedtime      Last Written Prescription Date:  4/20/21  Last Fill Quantity: 180,   # refills: 0  Last Office Visit : 7/17/19  RTC 1 yr  Future Office visit: none    Routing refill request to provider for review/approval because:  Drug not on the [facility], P or [facility] refill protocol or controlled substance    Overdue visit > 18 months.

## 2021-07-14 RX ORDER — GABAPENTIN 100 MG/1
200 CAPSULE ORAL AT BEDTIME
Qty: 180 CAPSULE | OUTPATIENT
Start: 2021-07-14

## 2023-05-06 ENCOUNTER — HEALTH MAINTENANCE LETTER (OUTPATIENT)
Age: 58
End: 2023-05-06